# Patient Record
Sex: MALE | Race: OTHER | HISPANIC OR LATINO | URBAN - METROPOLITAN AREA
[De-identification: names, ages, dates, MRNs, and addresses within clinical notes are randomized per-mention and may not be internally consistent; named-entity substitution may affect disease eponyms.]

---

## 2023-10-24 ENCOUNTER — OUTPATIENT (OUTPATIENT)
Dept: OUTPATIENT SERVICES | Facility: HOSPITAL | Age: 68
LOS: 1 days | End: 2023-10-24
Payer: MEDICAID

## 2023-10-24 DIAGNOSIS — J20.9 ACUTE BRONCHITIS, UNSPECIFIED: ICD-10-CM

## 2023-10-24 PROCEDURE — 71046 X-RAY EXAM CHEST 2 VIEWS: CPT

## 2023-10-24 PROCEDURE — 71046 X-RAY EXAM CHEST 2 VIEWS: CPT | Mod: 26

## 2023-10-25 DIAGNOSIS — J20.9 ACUTE BRONCHITIS, UNSPECIFIED: ICD-10-CM

## 2023-12-27 ENCOUNTER — INPATIENT (INPATIENT)
Facility: HOSPITAL | Age: 68
LOS: 1 days | Discharge: HOME | DRG: 193 | End: 2023-12-29
Attending: INTERNAL MEDICINE | Admitting: HOSPITALIST
Payer: MEDICARE

## 2023-12-27 VITALS
HEART RATE: 61 BPM | SYSTOLIC BLOOD PRESSURE: 128 MMHG | DIASTOLIC BLOOD PRESSURE: 58 MMHG | RESPIRATION RATE: 19 BRPM | OXYGEN SATURATION: 100 %

## 2023-12-27 DIAGNOSIS — Z90.49 ACQUIRED ABSENCE OF OTHER SPECIFIED PARTS OF DIGESTIVE TRACT: Chronic | ICD-10-CM

## 2023-12-27 DIAGNOSIS — R06.2 WHEEZING: ICD-10-CM

## 2023-12-27 DIAGNOSIS — B97.89 OTHER VIRAL AGENTS AS THE CAUSE OF DISEASES CLASSIFIED ELSEWHERE: ICD-10-CM

## 2023-12-27 DIAGNOSIS — Z98.890 OTHER SPECIFIED POSTPROCEDURAL STATES: Chronic | ICD-10-CM

## 2023-12-27 DIAGNOSIS — R53.1 WEAKNESS: ICD-10-CM

## 2023-12-27 LAB
ALBUMIN SERPL ELPH-MCNC: 3.7 G/DL — SIGNIFICANT CHANGE UP (ref 3.5–5.2)
ALBUMIN SERPL ELPH-MCNC: 3.7 G/DL — SIGNIFICANT CHANGE UP (ref 3.5–5.2)
ALP SERPL-CCNC: 58 U/L — SIGNIFICANT CHANGE UP (ref 30–115)
ALP SERPL-CCNC: 58 U/L — SIGNIFICANT CHANGE UP (ref 30–115)
ALT FLD-CCNC: 30 U/L — SIGNIFICANT CHANGE UP (ref 0–41)
ALT FLD-CCNC: 30 U/L — SIGNIFICANT CHANGE UP (ref 0–41)
ANION GAP SERPL CALC-SCNC: 12 MMOL/L — SIGNIFICANT CHANGE UP (ref 7–14)
ANION GAP SERPL CALC-SCNC: 12 MMOL/L — SIGNIFICANT CHANGE UP (ref 7–14)
APTT BLD: 37.3 SEC — SIGNIFICANT CHANGE UP (ref 27–39.2)
APTT BLD: 37.3 SEC — SIGNIFICANT CHANGE UP (ref 27–39.2)
AST SERPL-CCNC: 64 U/L — HIGH (ref 0–41)
AST SERPL-CCNC: 64 U/L — HIGH (ref 0–41)
BASE EXCESS BLDV CALC-SCNC: 3.6 MMOL/L — HIGH (ref -2–3)
BASE EXCESS BLDV CALC-SCNC: 3.6 MMOL/L — HIGH (ref -2–3)
BASOPHILS # BLD AUTO: 0.02 K/UL — SIGNIFICANT CHANGE UP (ref 0–0.2)
BASOPHILS # BLD AUTO: 0.02 K/UL — SIGNIFICANT CHANGE UP (ref 0–0.2)
BASOPHILS NFR BLD AUTO: 0.3 % — SIGNIFICANT CHANGE UP (ref 0–1)
BASOPHILS NFR BLD AUTO: 0.3 % — SIGNIFICANT CHANGE UP (ref 0–1)
BILIRUB SERPL-MCNC: 0.4 MG/DL — SIGNIFICANT CHANGE UP (ref 0.2–1.2)
BILIRUB SERPL-MCNC: 0.4 MG/DL — SIGNIFICANT CHANGE UP (ref 0.2–1.2)
BUN SERPL-MCNC: 18 MG/DL — SIGNIFICANT CHANGE UP (ref 10–20)
BUN SERPL-MCNC: 18 MG/DL — SIGNIFICANT CHANGE UP (ref 10–20)
CA-I SERPL-SCNC: 1.15 MMOL/L — SIGNIFICANT CHANGE UP (ref 1.15–1.33)
CA-I SERPL-SCNC: 1.15 MMOL/L — SIGNIFICANT CHANGE UP (ref 1.15–1.33)
CALCIUM SERPL-MCNC: 8.8 MG/DL — SIGNIFICANT CHANGE UP (ref 8.4–10.4)
CALCIUM SERPL-MCNC: 8.8 MG/DL — SIGNIFICANT CHANGE UP (ref 8.4–10.4)
CHLORIDE SERPL-SCNC: 100 MMOL/L — SIGNIFICANT CHANGE UP (ref 98–110)
CHLORIDE SERPL-SCNC: 100 MMOL/L — SIGNIFICANT CHANGE UP (ref 98–110)
CO2 SERPL-SCNC: 26 MMOL/L — SIGNIFICANT CHANGE UP (ref 17–32)
CO2 SERPL-SCNC: 26 MMOL/L — SIGNIFICANT CHANGE UP (ref 17–32)
CREAT SERPL-MCNC: 0.8 MG/DL — SIGNIFICANT CHANGE UP (ref 0.7–1.5)
CREAT SERPL-MCNC: 0.8 MG/DL — SIGNIFICANT CHANGE UP (ref 0.7–1.5)
EGFR: 96 ML/MIN/1.73M2 — SIGNIFICANT CHANGE UP
EGFR: 96 ML/MIN/1.73M2 — SIGNIFICANT CHANGE UP
EOSINOPHIL # BLD AUTO: 0 K/UL — SIGNIFICANT CHANGE UP (ref 0–0.7)
EOSINOPHIL # BLD AUTO: 0 K/UL — SIGNIFICANT CHANGE UP (ref 0–0.7)
EOSINOPHIL NFR BLD AUTO: 0 % — SIGNIFICANT CHANGE UP (ref 0–8)
EOSINOPHIL NFR BLD AUTO: 0 % — SIGNIFICANT CHANGE UP (ref 0–8)
FLUAV AG NPH QL: DETECTED
FLUAV AG NPH QL: DETECTED
FLUBV AG NPH QL: SIGNIFICANT CHANGE UP
FLUBV AG NPH QL: SIGNIFICANT CHANGE UP
GAS PNL BLDV: 134 MMOL/L — LOW (ref 136–145)
GAS PNL BLDV: 134 MMOL/L — LOW (ref 136–145)
GAS PNL BLDV: SIGNIFICANT CHANGE UP
GLUCOSE SERPL-MCNC: 109 MG/DL — HIGH (ref 70–99)
GLUCOSE SERPL-MCNC: 109 MG/DL — HIGH (ref 70–99)
HCO3 BLDV-SCNC: 29 MMOL/L — SIGNIFICANT CHANGE UP (ref 22–29)
HCO3 BLDV-SCNC: 29 MMOL/L — SIGNIFICANT CHANGE UP (ref 22–29)
HCT VFR BLD CALC: 38.1 % — LOW (ref 42–52)
HCT VFR BLD CALC: 38.1 % — LOW (ref 42–52)
HCT VFR BLDA CALC: 36 % — LOW (ref 39–51)
HCT VFR BLDA CALC: 36 % — LOW (ref 39–51)
HGB BLD CALC-MCNC: 11.9 G/DL — LOW (ref 12.6–17.4)
HGB BLD CALC-MCNC: 11.9 G/DL — LOW (ref 12.6–17.4)
HGB BLD-MCNC: 12.6 G/DL — LOW (ref 14–18)
HGB BLD-MCNC: 12.6 G/DL — LOW (ref 14–18)
IMM GRANULOCYTES NFR BLD AUTO: 0.3 % — SIGNIFICANT CHANGE UP (ref 0.1–0.3)
IMM GRANULOCYTES NFR BLD AUTO: 0.3 % — SIGNIFICANT CHANGE UP (ref 0.1–0.3)
INR BLD: 1.16 RATIO — SIGNIFICANT CHANGE UP (ref 0.65–1.3)
INR BLD: 1.16 RATIO — SIGNIFICANT CHANGE UP (ref 0.65–1.3)
LACTATE BLDV-MCNC: 2.2 MMOL/L — HIGH (ref 0.5–2)
LACTATE BLDV-MCNC: 2.2 MMOL/L — HIGH (ref 0.5–2)
LACTATE SERPL-SCNC: 2 MMOL/L — SIGNIFICANT CHANGE UP (ref 0.7–2)
LACTATE SERPL-SCNC: 2 MMOL/L — SIGNIFICANT CHANGE UP (ref 0.7–2)
LIDOCAIN IGE QN: 18 U/L — SIGNIFICANT CHANGE UP (ref 7–60)
LIDOCAIN IGE QN: 18 U/L — SIGNIFICANT CHANGE UP (ref 7–60)
LYMPHOCYTES # BLD AUTO: 0.8 K/UL — LOW (ref 1.2–3.4)
LYMPHOCYTES # BLD AUTO: 0.8 K/UL — LOW (ref 1.2–3.4)
LYMPHOCYTES # BLD AUTO: 13.3 % — LOW (ref 20.5–51.1)
LYMPHOCYTES # BLD AUTO: 13.3 % — LOW (ref 20.5–51.1)
MAGNESIUM SERPL-MCNC: 2 MG/DL — SIGNIFICANT CHANGE UP (ref 1.8–2.4)
MAGNESIUM SERPL-MCNC: 2 MG/DL — SIGNIFICANT CHANGE UP (ref 1.8–2.4)
MCHC RBC-ENTMCNC: 30.8 PG — SIGNIFICANT CHANGE UP (ref 27–31)
MCHC RBC-ENTMCNC: 30.8 PG — SIGNIFICANT CHANGE UP (ref 27–31)
MCHC RBC-ENTMCNC: 33.1 G/DL — SIGNIFICANT CHANGE UP (ref 32–37)
MCHC RBC-ENTMCNC: 33.1 G/DL — SIGNIFICANT CHANGE UP (ref 32–37)
MCV RBC AUTO: 93.2 FL — SIGNIFICANT CHANGE UP (ref 80–94)
MCV RBC AUTO: 93.2 FL — SIGNIFICANT CHANGE UP (ref 80–94)
MONOCYTES # BLD AUTO: 1 K/UL — HIGH (ref 0.1–0.6)
MONOCYTES # BLD AUTO: 1 K/UL — HIGH (ref 0.1–0.6)
MONOCYTES NFR BLD AUTO: 16.6 % — HIGH (ref 1.7–9.3)
MONOCYTES NFR BLD AUTO: 16.6 % — HIGH (ref 1.7–9.3)
NEUTROPHILS # BLD AUTO: 4.19 K/UL — SIGNIFICANT CHANGE UP (ref 1.4–6.5)
NEUTROPHILS # BLD AUTO: 4.19 K/UL — SIGNIFICANT CHANGE UP (ref 1.4–6.5)
NEUTROPHILS NFR BLD AUTO: 69.5 % — SIGNIFICANT CHANGE UP (ref 42.2–75.2)
NEUTROPHILS NFR BLD AUTO: 69.5 % — SIGNIFICANT CHANGE UP (ref 42.2–75.2)
NRBC # BLD: 0 /100 WBCS — SIGNIFICANT CHANGE UP (ref 0–0)
NRBC # BLD: 0 /100 WBCS — SIGNIFICANT CHANGE UP (ref 0–0)
NT-PROBNP SERPL-SCNC: 3157 PG/ML — HIGH (ref 0–300)
NT-PROBNP SERPL-SCNC: 3157 PG/ML — HIGH (ref 0–300)
PCO2 BLDV: 47 MMHG — SIGNIFICANT CHANGE UP (ref 42–55)
PCO2 BLDV: 47 MMHG — SIGNIFICANT CHANGE UP (ref 42–55)
PH BLDV: 7.4 — SIGNIFICANT CHANGE UP (ref 7.32–7.43)
PH BLDV: 7.4 — SIGNIFICANT CHANGE UP (ref 7.32–7.43)
PLATELET # BLD AUTO: 154 K/UL — SIGNIFICANT CHANGE UP (ref 130–400)
PLATELET # BLD AUTO: 154 K/UL — SIGNIFICANT CHANGE UP (ref 130–400)
PMV BLD: 12.6 FL — HIGH (ref 7.4–10.4)
PMV BLD: 12.6 FL — HIGH (ref 7.4–10.4)
PO2 BLDV: 35 MMHG — SIGNIFICANT CHANGE UP (ref 25–45)
PO2 BLDV: 35 MMHG — SIGNIFICANT CHANGE UP (ref 25–45)
POTASSIUM BLDV-SCNC: 5.2 MMOL/L — HIGH (ref 3.5–5.1)
POTASSIUM BLDV-SCNC: 5.2 MMOL/L — HIGH (ref 3.5–5.1)
POTASSIUM SERPL-MCNC: 5.7 MMOL/L — HIGH (ref 3.5–5)
POTASSIUM SERPL-MCNC: 5.7 MMOL/L — HIGH (ref 3.5–5)
POTASSIUM SERPL-SCNC: 5.7 MMOL/L — HIGH (ref 3.5–5)
POTASSIUM SERPL-SCNC: 5.7 MMOL/L — HIGH (ref 3.5–5)
PROT SERPL-MCNC: 6.9 G/DL — SIGNIFICANT CHANGE UP (ref 6–8)
PROT SERPL-MCNC: 6.9 G/DL — SIGNIFICANT CHANGE UP (ref 6–8)
PROTHROM AB SERPL-ACNC: 13.2 SEC — HIGH (ref 9.95–12.87)
PROTHROM AB SERPL-ACNC: 13.2 SEC — HIGH (ref 9.95–12.87)
RBC # BLD: 4.09 M/UL — LOW (ref 4.7–6.1)
RBC # BLD: 4.09 M/UL — LOW (ref 4.7–6.1)
RBC # FLD: 14.3 % — SIGNIFICANT CHANGE UP (ref 11.5–14.5)
RBC # FLD: 14.3 % — SIGNIFICANT CHANGE UP (ref 11.5–14.5)
RSV RNA NPH QL NAA+NON-PROBE: DETECTED
RSV RNA NPH QL NAA+NON-PROBE: DETECTED
SAO2 % BLDV: 47.7 % — LOW (ref 67–88)
SAO2 % BLDV: 47.7 % — LOW (ref 67–88)
SARS-COV-2 RNA SPEC QL NAA+PROBE: SIGNIFICANT CHANGE UP
SARS-COV-2 RNA SPEC QL NAA+PROBE: SIGNIFICANT CHANGE UP
SODIUM SERPL-SCNC: 138 MMOL/L — SIGNIFICANT CHANGE UP (ref 135–146)
SODIUM SERPL-SCNC: 138 MMOL/L — SIGNIFICANT CHANGE UP (ref 135–146)
WBC # BLD: 6.03 K/UL — SIGNIFICANT CHANGE UP (ref 4.8–10.8)
WBC # BLD: 6.03 K/UL — SIGNIFICANT CHANGE UP (ref 4.8–10.8)
WBC # FLD AUTO: 6.03 K/UL — SIGNIFICANT CHANGE UP (ref 4.8–10.8)
WBC # FLD AUTO: 6.03 K/UL — SIGNIFICANT CHANGE UP (ref 4.8–10.8)

## 2023-12-27 PROCEDURE — 72170 X-RAY EXAM OF PELVIS: CPT | Mod: 26

## 2023-12-27 PROCEDURE — 71045 X-RAY EXAM CHEST 1 VIEW: CPT

## 2023-12-27 PROCEDURE — 93005 ELECTROCARDIOGRAM TRACING: CPT

## 2023-12-27 PROCEDURE — 86803 HEPATITIS C AB TEST: CPT

## 2023-12-27 PROCEDURE — 82962 GLUCOSE BLOOD TEST: CPT

## 2023-12-27 PROCEDURE — 99222 1ST HOSP IP/OBS MODERATE 55: CPT | Mod: GC

## 2023-12-27 PROCEDURE — 85025 COMPLETE CBC W/AUTO DIFF WBC: CPT

## 2023-12-27 PROCEDURE — 83036 HEMOGLOBIN GLYCOSYLATED A1C: CPT

## 2023-12-27 PROCEDURE — 80053 COMPREHEN METABOLIC PANEL: CPT

## 2023-12-27 PROCEDURE — 71045 X-RAY EXAM CHEST 1 VIEW: CPT | Mod: 26

## 2023-12-27 PROCEDURE — 72125 CT NECK SPINE W/O DYE: CPT | Mod: 26,MA

## 2023-12-27 PROCEDURE — 93306 TTE W/DOPPLER COMPLETE: CPT

## 2023-12-27 PROCEDURE — 97162 PT EVAL MOD COMPLEX 30 MIN: CPT | Mod: GP

## 2023-12-27 PROCEDURE — G0378: CPT

## 2023-12-27 PROCEDURE — 73030 X-RAY EXAM OF SHOULDER: CPT | Mod: 26,LT,RT

## 2023-12-27 PROCEDURE — 84484 ASSAY OF TROPONIN QUANT: CPT

## 2023-12-27 PROCEDURE — 94640 AIRWAY INHALATION TREATMENT: CPT

## 2023-12-27 PROCEDURE — 83735 ASSAY OF MAGNESIUM: CPT

## 2023-12-27 PROCEDURE — 70450 CT HEAD/BRAIN W/O DYE: CPT | Mod: 26,MA

## 2023-12-27 PROCEDURE — 99285 EMERGENCY DEPT VISIT HI MDM: CPT

## 2023-12-27 PROCEDURE — 36415 COLL VENOUS BLD VENIPUNCTURE: CPT

## 2023-12-27 RX ORDER — LISINOPRIL 2.5 MG/1
10 TABLET ORAL DAILY
Refills: 0 | Status: DISCONTINUED | OUTPATIENT
Start: 2023-12-27 | End: 2023-12-27

## 2023-12-27 RX ORDER — ASPIRIN/CALCIUM CARB/MAGNESIUM 324 MG
1 TABLET ORAL
Refills: 0 | DISCHARGE

## 2023-12-27 RX ORDER — LISINOPRIL 2.5 MG/1
1 TABLET ORAL
Refills: 0 | DISCHARGE

## 2023-12-27 RX ORDER — ENOXAPARIN SODIUM 100 MG/ML
40 INJECTION SUBCUTANEOUS EVERY 24 HOURS
Refills: 0 | Status: DISCONTINUED | OUTPATIENT
Start: 2023-12-27 | End: 2023-12-29

## 2023-12-27 RX ORDER — IPRATROPIUM/ALBUTEROL SULFATE 18-103MCG
3 AEROSOL WITH ADAPTER (GRAM) INHALATION ONCE
Refills: 0 | Status: COMPLETED | OUTPATIENT
Start: 2023-12-27 | End: 2023-12-27

## 2023-12-27 RX ORDER — ATORVASTATIN CALCIUM 80 MG/1
40 TABLET, FILM COATED ORAL AT BEDTIME
Refills: 0 | Status: DISCONTINUED | OUTPATIENT
Start: 2023-12-27 | End: 2023-12-29

## 2023-12-27 RX ORDER — FUROSEMIDE 40 MG
20 TABLET ORAL DAILY
Refills: 0 | Status: DISCONTINUED | OUTPATIENT
Start: 2023-12-27 | End: 2023-12-28

## 2023-12-27 RX ORDER — MECLIZINE HCL 12.5 MG
1 TABLET ORAL
Refills: 0 | DISCHARGE

## 2023-12-27 RX ORDER — CARVEDILOL PHOSPHATE 80 MG/1
12.5 CAPSULE, EXTENDED RELEASE ORAL EVERY 12 HOURS
Refills: 0 | Status: DISCONTINUED | OUTPATIENT
Start: 2023-12-27 | End: 2023-12-29

## 2023-12-27 RX ORDER — METFORMIN HYDROCHLORIDE 850 MG/1
1 TABLET ORAL
Refills: 0 | DISCHARGE

## 2023-12-27 RX ORDER — ASPIRIN/CALCIUM CARB/MAGNESIUM 324 MG
81 TABLET ORAL DAILY
Refills: 0 | Status: DISCONTINUED | OUTPATIENT
Start: 2023-12-27 | End: 2023-12-29

## 2023-12-27 RX ORDER — ATORVASTATIN CALCIUM 80 MG/1
1 TABLET, FILM COATED ORAL
Refills: 0 | DISCHARGE

## 2023-12-27 RX ORDER — ONDANSETRON 8 MG/1
4 TABLET, FILM COATED ORAL ONCE
Refills: 0 | Status: COMPLETED | OUTPATIENT
Start: 2023-12-27 | End: 2023-12-27

## 2023-12-27 RX ORDER — IPRATROPIUM/ALBUTEROL SULFATE 18-103MCG
3 AEROSOL WITH ADAPTER (GRAM) INHALATION EVERY 6 HOURS
Refills: 0 | Status: DISCONTINUED | OUTPATIENT
Start: 2023-12-27 | End: 2023-12-29

## 2023-12-27 RX ORDER — MECLIZINE HCL 12.5 MG
12.5 TABLET ORAL DAILY
Refills: 0 | Status: DISCONTINUED | OUTPATIENT
Start: 2023-12-27 | End: 2023-12-29

## 2023-12-27 RX ORDER — CARVEDILOL PHOSPHATE 80 MG/1
1 CAPSULE, EXTENDED RELEASE ORAL
Refills: 0 | DISCHARGE

## 2023-12-27 RX ADMIN — Medication 3 MILLILITER(S): at 15:40

## 2023-12-27 RX ADMIN — Medication 3 MILLILITER(S): at 15:39

## 2023-12-27 RX ADMIN — Medication 125 MILLIGRAM(S): at 15:40

## 2023-12-27 RX ADMIN — Medication 75 MILLIGRAM(S): at 18:16

## 2023-12-27 NOTE — H&P ADULT - ATTENDING COMMENTS
HPI:   67 yo man Serbian speaking with pmh of CAD, and HTN, presents for fall from bed with trauma to shoulders.  No head trauma, no AC, no syncope   patient admitted to shortness of breath and coughing  since 2 days. He reports that his wife had the flu recently.  No chest  pain, no palpitations, no leg swelling, no syncope    Patient is a very poor historian    In ED he was found to have influenza pneumonia and does not know his medication or his medical conditions.  PMH obtained from ED chart. (27 Dec 2023 21:49)    REVIEW OF SYSTEMS: see cc/HPI   CONSTITUTIONAL: No weakness, fevers or chills  EYES/ENT: No visual changes;  No vertigo or throat pain   NECK: No pain or stiffness  RESPIRATORY: No cough, wheezing, hemoptysis; No shortness of breath  CARDIOVASCULAR: No chest pain or palpitations  GASTROINTESTINAL: No abdominal or epigastric pain. No nausea, vomiting, or hematemesis; No diarrhea or constipation. No melena or hematochezia.  GENITOURINARY: No dysuria, frequency or hematuria  NEUROLOGICAL: No numbness or weakness  SKIN: No itching, rashes    Physical Exam: Interviewed in Ecuadorean   General: WN/WD NAD  Neurology: A&Ox3, nonfocal, follows commands  Eyes: PERRLA/ EOMI  ENT/Neck: Neck supple, trachea midline, No JVD  Respiratory: B/L decreased breathsound, No wheezing, rales, rhonchi  CV: Normal rate regular rhythm, S1S2, no murmurs, rubs or gallops  Abdominal: Soft, NT, ND +BS, obese   Extremities: No edema, + peripheral pulses  Skin: No Rashes, Hematoma, Ecchymosis    A/p  Dyspnea 2/2 viral infections   Influenza A and RSV   -IV steroids / bronchodilators   -Tamiflu   -pulse ox monitoring and O2 PRN     Fall mechanical   -PT eval     Hyperkalemia - hemolyzed   - repeat K     CAD   HTN   Dyslipidemia   -c/w OP Rx and restart ACE if K - WNL     PATIENT SEEN by ATTENDING 12/27/23 HPI:   67 yo man Albanian speaking with pmh of CAD, and HTN, presents for fall from bed with trauma to shoulders.  No head trauma, no AC, no syncope   patient admitted to shortness of breath and coughing  since 2 days. He reports that his wife had the flu recently.  No chest  pain, no palpitations, no leg swelling, no syncope    Patient is a very poor historian    In ED he was found to have influenza pneumonia and does not know his medication or his medical conditions.  PMH obtained from ED chart. (27 Dec 2023 21:49)    REVIEW OF SYSTEMS: see cc/HPI   CONSTITUTIONAL: No weakness, fevers or chills  EYES/ENT: No visual changes;  No vertigo or throat pain   NECK: No pain or stiffness  RESPIRATORY: No cough, wheezing, hemoptysis; No shortness of breath  CARDIOVASCULAR: No chest pain or palpitations  GASTROINTESTINAL: No abdominal or epigastric pain. No nausea, vomiting, or hematemesis; No diarrhea or constipation. No melena or hematochezia.  GENITOURINARY: No dysuria, frequency or hematuria  NEUROLOGICAL: No numbness or weakness  SKIN: No itching, rashes    Physical Exam: Interviewed in Scottish   General: WN/WD NAD  Neurology: A&Ox3, nonfocal, follows commands  Eyes: PERRLA/ EOMI  ENT/Neck: Neck supple, trachea midline, No JVD  Respiratory: B/L decreased breathsound, No wheezing, rales, rhonchi  CV: Normal rate regular rhythm, S1S2, no murmurs, rubs or gallops  Abdominal: Soft, NT, ND +BS, obese   Extremities: No edema, + peripheral pulses  Skin: No Rashes, Hematoma, Ecchymosis    A/p  Dyspnea 2/2 viral infections   Influenza A and RSV   -IV steroids / bronchodilators   -Tamiflu   -pulse ox monitoring and O2 PRN     Fall mechanical   -PT eval     Hyperkalemia - hemolyzed   - repeat K     CAD   HTN   Dyslipidemia   -c/w OP Rx and restart ACE if K - WNL     PATIENT SEEN by ATTENDING 12/27/23

## 2023-12-27 NOTE — ED ADULT NURSE NOTE - OBJECTIVE STATEMENT
pt came in c/o fall that happened last night on his side. pt states he landed on his R am and + HT. no loc. no ac use.

## 2023-12-27 NOTE — H&P ADULT - NSHPLABSRESULTS_GEN_ALL_CORE
LABS:  cret                        12.6   6.03  )-----------( 154      ( 27 Dec 2023 12:29 )             38.1     12-27    138  |  100  |  18  ----------------------------<  109<H>  5.7<H>   |  26  |  0.8    Ca    8.8      27 Dec 2023 12:29  Mg     2.0     12-27    TPro  6.9  /  Alb  3.7  /  TBili  0.4  /  DBili  x   /  AST  64<H>  /  ALT  30  /  AlkPhos  58  12-27    PT/INR - ( 27 Dec 2023 12:29 )   PT: 13.20 sec;   INR: 1.16 ratio         PTT - ( 27 Dec 2023 12:29 )  PTT:37.3 sec LABS:               12.6   6.03  )-----------( 154      ( 27 Dec 2023 12:29 )             38.1     12-27    138  |  100  |  18  ----------------------------<  109<H>  5.7<H>   |  26  |  0.8    Ca    8.8      27 Dec 2023 12:29  Mg     2.0     12-27    TPro  6.9  /  Alb  3.7  /  TBili  0.4  /  DBili  x   /  AST  64<H>  /  ALT  30  /  AlkPhos  58  12-27    PT/INR - ( 27 Dec 2023 12:29 )   PT: 13.20 sec;   INR: 1.16 ratio         PTT - ( 27 Dec 2023 12:29 )  PTT:37.3 sec

## 2023-12-27 NOTE — ED PROVIDER NOTE - CARE PLAN
Principal Discharge DX:	Influenza A  Secondary Diagnosis:	Respiratory syncytial virus  Secondary Diagnosis:	Weakness   1

## 2023-12-27 NOTE — ED PROVIDER NOTE - PHYSICAL EXAMINATION
VITAL SIGNS: I have reviewed nursing notes and confirm.  CONSTITUTIONAL: Well-developed; well-nourished; in no acute distress.  SKIN: Skin exam is warm and dry, no acute rash.  HEAD: Normocephalic; atraumatic.  EYES: PERRL, EOM intact; conjunctiva and sclera clear.  ENT: No nasal discharge; airway clear. TMs clear.  NECK: Supple; non tender.  CARD: S1, S2 normal; no murmurs, gallops, or rubs. Regular rate and rhythm.  RESP: b/l ronchi and wheezing  ABD: Normal bowel sounds; soft; non-distended; non-tender;  EXT: Normal ROM. No clubbing, cyanosis or edema.  PSYCH: Cooperative, appropriate.

## 2023-12-27 NOTE — ED ADULT NURSE NOTE - NSFALLRISKINTERV_ED_ALL_ED
Assistance OOB with selected safe patient handling equipment if applicable/Assistance with ambulation/Communicate fall risk and risk factors to all staff, patient, and family/Provide visual cue: yellow wristband, yellow gown, etc/Reinforce activity limits and safety measures with patient and family/Call bell, personal items and telephone in reach/Instruct patient to call for assistance before getting out of bed/chair/stretcher/Non-slip footwear applied when patient is off stretcher/[place] to call system/Physically safe environment - no spills, clutter or unnecessary equipment/Purposeful Proactive Rounding/Room/bathroom lighting operational, light cord in reach Assistance OOB with selected safe patient handling equipment if applicable/Assistance with ambulation/Communicate fall risk and risk factors to all staff, patient, and family/Provide visual cue: yellow wristband, yellow gown, etc/Reinforce activity limits and safety measures with patient and family/Call bell, personal items and telephone in reach/Instruct patient to call for assistance before getting out of bed/chair/stretcher/Non-slip footwear applied when patient is off stretcher/[place] to call system/Physically safe environment - no spills, clutter or unnecessary equipment/Purposeful Proactive Rounding/Room/bathroom lighting operational, light cord in reach

## 2023-12-27 NOTE — H&P ADULT - HISTORY OF PRESENT ILLNESS
67 yo man Chinese speaking with pmh of CAD, and HTN, presents for fall from bed with trauma to shoulders.  No head trauma, no AC, no syncope   patient admitted to shortness of breath and coughing  since 2 days. He reports that his wife had the flu recently.  No chest  pain, no palpitations, no leg swelling, no syncompe    Patient is a very poor historian    In ED he was found to have influenza pneumonia and does not know his medication or his medical conditions.  PMH obtained from ED chart.  67 yo man Prydeinig speaking with pmh of CAD, and HTN, presents for fall from bed with trauma to shoulders.  No head trauma, no AC, no syncope   patient admitted to shortness of breath and coughing  since 2 days. He reports that his wife had the flu recently.  No chest  pain, no palpitations, no leg swelling, no syncompe    Patient is a very poor historian    In ED he was found to have influenza pneumonia and does not know his medication or his medical conditions.  PMH obtained from ED chart.

## 2023-12-27 NOTE — ED PROVIDER NOTE - CLINICAL SUMMARY MEDICAL DECISION MAKING FREE TEXT BOX
Pt with fall from bed, wheezing and fluctuating O2 sat in ED, no traumatic pathology on w/u, found to have flu and rsv.  pt says feeling better with neb, but pt still with wheezing and too weak to get up.  will admit to med.  Any ordered labs and EKG were reviewed.  Any imaging was ordered and reviewed by me.  Appropriate medications for patient's presenting complaints were ordered and effects were reassessed.  Patient's records (prior hospital, ED visit, and/or nursing home notes if available) were reviewed.  Additional history was obtained from EMS, family, and/or PCP (where available).  Escalation to admission/observation was considered.  Patient requires inpatient hospitalization - monitored setting.

## 2023-12-27 NOTE — H&P ADULT - ASSESSMENT
#Fall  -PT/OT      #Influenza A   #RSV  - oseltamivir      #hyperkalemia       #Fall  - Mechanical fall  - CT trauma work up  -ve fo rfractures  - PT/OT      #Influenza A   #RSV  - improving  - s/p solu-medrol once  - oseltamivir  - Bronchodilators      #hyperkalemia  - hold lisinopril  - repeat K      #CAD  #HT  #DL  - c/w home meds    #preDM  - A1c   - monitor FS     #Fall  - Mechanical fall  - CT trauma work up  -ve fo rfractures  - PT/OT    #Dyspnea  #Influenza A infection  #RSV infection  #mild volume overload  - PRo-BNP 3100  - improving  - s/p solu-medrol once  - c/w oseltamivir  - Bronchodilators  - lasix 20mg IV qd      #hyperkalemia  - hold lisinopril  - repeat K      #CAD  #HTN  #DL  - c/w home meds except lisinopril    #preDM  - A1c   - monitor FS

## 2023-12-27 NOTE — ED ADULT TRIAGE NOTE - CHIEF COMPLAINT QUOTE
s/p fall last night out of bed at 9 pm. pt complains of right shoulder pain. pt is also having b/l wheezing. initial pox 92 ra

## 2023-12-27 NOTE — ED PROVIDER NOTE - IV ALTEPLASE EXCL REL HIDDEN
show
I, Aparna Kc, participated in the care of this patient with the PA. I discussed the history and physical exam findings as well as lab results and plan of care with the PA. I agree with PA's history, physical and assessment. I agree with final disposition.

## 2023-12-27 NOTE — H&P ADULT - NSHPPHYSICALEXAM_GEN_ALL_CORE
T(C): 37 (12-27-23 @ 15:18), Max: 37.7 (12-27-23 @ 11:37)  HR: 59 (12-27-23 @ 15:18) (59 - 63)  BP: 151/70 (12-27-23 @ 15:18) (128/58 - 151/70)  RR: 18 (12-27-23 @ 15:18) (18 - 19)  SpO2: 98% (12-27-23 @ 15:18) (98% - 100%)    CONSTITUTIONAL: No apparent distress  EYES: PERRLA and symmetric, EOMI, No conjunctival or scleral injection, non-icteric  ENMT: Oral mucosa with moist membranes. no pharyngeal injection or exudates  NECK: Supple, symmetric and without tracheal deviation   RESP: No respiratory distress, no use of accessory muscles; CTA b/l, no WRR  CV: RRR, +S1S2,; no JVD; no peripheral edema  GI: Soft, NT, ND, no rebound, no guarding;   NEURO:AOx3, no focal deficits T(C): 37 (12-27-23 @ 15:18), Max: 37.7 (12-27-23 @ 11:37)  HR: 59 (12-27-23 @ 15:18) (59 - 63)  BP: 151/70 (12-27-23 @ 15:18) (128/58 - 151/70)  RR: 18 (12-27-23 @ 15:18) (18 - 19)  SpO2: 98% (12-27-23 @ 15:18) (98% - 100%)    CONSTITUTIONAL: No apparent distress  EYES: PERRLA and symmetric, EOMI, No conjunctival or scleral injection, non-icteric  ENMT: Oral mucosa with moist membranes. no pharyngeal injection or exudates  NECK: Supple, symmetric and without tracheal deviation   RESP: No respiratory distress,  decreased breath sound bilaterally, no wheezing heard  CV: RRR, +S1S2,; no JVD; no peripheral edema  GI: Soft, NT, ND, no rebound, no guarding;   NEURO:AOx3, no focal deficits

## 2023-12-27 NOTE — H&P ADULT - NSICDXPASTMEDICALHX_GEN_ALL_CORE_FT
PAST MEDICAL HISTORY:  CAD (coronary artery disease)     DM (diabetes mellitus)     Dyslipidemia     Mild HTN

## 2023-12-27 NOTE — ED PROVIDER NOTE - OBJECTIVE STATEMENT
[age] yo m with pmh of cad, htn, presents with c/o b/l shoulder pain s/p fall from bed.  pt admitted coughing x 4 days with mild sob.  no cp, no abd pain, no headache.  unsure if hit head.  mild neck pain.  on asa, no AC

## 2023-12-28 LAB
A1C WITH ESTIMATED AVERAGE GLUCOSE RESULT: 7.6 % — HIGH (ref 4–5.6)
A1C WITH ESTIMATED AVERAGE GLUCOSE RESULT: 7.6 % — HIGH (ref 4–5.6)
ALBUMIN SERPL ELPH-MCNC: 3.9 G/DL — SIGNIFICANT CHANGE UP (ref 3.5–5.2)
ALBUMIN SERPL ELPH-MCNC: 3.9 G/DL — SIGNIFICANT CHANGE UP (ref 3.5–5.2)
ALP SERPL-CCNC: 66 U/L — SIGNIFICANT CHANGE UP (ref 30–115)
ALP SERPL-CCNC: 66 U/L — SIGNIFICANT CHANGE UP (ref 30–115)
ALT FLD-CCNC: 42 U/L — HIGH (ref 0–41)
ALT FLD-CCNC: 42 U/L — HIGH (ref 0–41)
ANION GAP SERPL CALC-SCNC: 14 MMOL/L — SIGNIFICANT CHANGE UP (ref 7–14)
ANION GAP SERPL CALC-SCNC: 14 MMOL/L — SIGNIFICANT CHANGE UP (ref 7–14)
AST SERPL-CCNC: 62 U/L — HIGH (ref 0–41)
AST SERPL-CCNC: 62 U/L — HIGH (ref 0–41)
BASOPHILS # BLD AUTO: 0 K/UL — SIGNIFICANT CHANGE UP (ref 0–0.2)
BASOPHILS # BLD AUTO: 0 K/UL — SIGNIFICANT CHANGE UP (ref 0–0.2)
BASOPHILS NFR BLD AUTO: 0 % — SIGNIFICANT CHANGE UP (ref 0–1)
BASOPHILS NFR BLD AUTO: 0 % — SIGNIFICANT CHANGE UP (ref 0–1)
BILIRUB SERPL-MCNC: 0.3 MG/DL — SIGNIFICANT CHANGE UP (ref 0.2–1.2)
BILIRUB SERPL-MCNC: 0.3 MG/DL — SIGNIFICANT CHANGE UP (ref 0.2–1.2)
BUN SERPL-MCNC: 21 MG/DL — HIGH (ref 10–20)
BUN SERPL-MCNC: 21 MG/DL — HIGH (ref 10–20)
CALCIUM SERPL-MCNC: 8.5 MG/DL — SIGNIFICANT CHANGE UP (ref 8.4–10.4)
CALCIUM SERPL-MCNC: 8.5 MG/DL — SIGNIFICANT CHANGE UP (ref 8.4–10.4)
CHLORIDE SERPL-SCNC: 100 MMOL/L — SIGNIFICANT CHANGE UP (ref 98–110)
CHLORIDE SERPL-SCNC: 100 MMOL/L — SIGNIFICANT CHANGE UP (ref 98–110)
CO2 SERPL-SCNC: 25 MMOL/L — SIGNIFICANT CHANGE UP (ref 17–32)
CO2 SERPL-SCNC: 25 MMOL/L — SIGNIFICANT CHANGE UP (ref 17–32)
CREAT SERPL-MCNC: 0.7 MG/DL — SIGNIFICANT CHANGE UP (ref 0.7–1.5)
CREAT SERPL-MCNC: 0.7 MG/DL — SIGNIFICANT CHANGE UP (ref 0.7–1.5)
EGFR: 100 ML/MIN/1.73M2 — SIGNIFICANT CHANGE UP
EGFR: 100 ML/MIN/1.73M2 — SIGNIFICANT CHANGE UP
EOSINOPHIL # BLD AUTO: 0 K/UL — SIGNIFICANT CHANGE UP (ref 0–0.7)
EOSINOPHIL # BLD AUTO: 0 K/UL — SIGNIFICANT CHANGE UP (ref 0–0.7)
EOSINOPHIL NFR BLD AUTO: 0 % — SIGNIFICANT CHANGE UP (ref 0–8)
EOSINOPHIL NFR BLD AUTO: 0 % — SIGNIFICANT CHANGE UP (ref 0–8)
ESTIMATED AVERAGE GLUCOSE: 171 MG/DL — HIGH (ref 68–114)
ESTIMATED AVERAGE GLUCOSE: 171 MG/DL — HIGH (ref 68–114)
GLUCOSE BLDC GLUCOMTR-MCNC: 274 MG/DL — HIGH (ref 70–99)
GLUCOSE BLDC GLUCOMTR-MCNC: 274 MG/DL — HIGH (ref 70–99)
GLUCOSE BLDC GLUCOMTR-MCNC: 293 MG/DL — HIGH (ref 70–99)
GLUCOSE BLDC GLUCOMTR-MCNC: 293 MG/DL — HIGH (ref 70–99)
GLUCOSE SERPL-MCNC: 227 MG/DL — HIGH (ref 70–99)
GLUCOSE SERPL-MCNC: 227 MG/DL — HIGH (ref 70–99)
HCT VFR BLD CALC: 41.6 % — LOW (ref 42–52)
HCT VFR BLD CALC: 41.6 % — LOW (ref 42–52)
HCV AB S/CO SERPL IA: 0.04 COI — SIGNIFICANT CHANGE UP
HCV AB S/CO SERPL IA: 0.04 COI — SIGNIFICANT CHANGE UP
HCV AB SERPL-IMP: SIGNIFICANT CHANGE UP
HCV AB SERPL-IMP: SIGNIFICANT CHANGE UP
HGB BLD-MCNC: 13.8 G/DL — LOW (ref 14–18)
HGB BLD-MCNC: 13.8 G/DL — LOW (ref 14–18)
IMM GRANULOCYTES NFR BLD AUTO: 0.3 % — SIGNIFICANT CHANGE UP (ref 0.1–0.3)
IMM GRANULOCYTES NFR BLD AUTO: 0.3 % — SIGNIFICANT CHANGE UP (ref 0.1–0.3)
LYMPHOCYTES # BLD AUTO: 0.79 K/UL — LOW (ref 1.2–3.4)
LYMPHOCYTES # BLD AUTO: 0.79 K/UL — LOW (ref 1.2–3.4)
LYMPHOCYTES # BLD AUTO: 12.3 % — LOW (ref 20.5–51.1)
LYMPHOCYTES # BLD AUTO: 12.3 % — LOW (ref 20.5–51.1)
MAGNESIUM SERPL-MCNC: 2.1 MG/DL — SIGNIFICANT CHANGE UP (ref 1.8–2.4)
MAGNESIUM SERPL-MCNC: 2.1 MG/DL — SIGNIFICANT CHANGE UP (ref 1.8–2.4)
MCHC RBC-ENTMCNC: 30.4 PG — SIGNIFICANT CHANGE UP (ref 27–31)
MCHC RBC-ENTMCNC: 30.4 PG — SIGNIFICANT CHANGE UP (ref 27–31)
MCHC RBC-ENTMCNC: 33.2 G/DL — SIGNIFICANT CHANGE UP (ref 32–37)
MCHC RBC-ENTMCNC: 33.2 G/DL — SIGNIFICANT CHANGE UP (ref 32–37)
MCV RBC AUTO: 91.6 FL — SIGNIFICANT CHANGE UP (ref 80–94)
MCV RBC AUTO: 91.6 FL — SIGNIFICANT CHANGE UP (ref 80–94)
MONOCYTES # BLD AUTO: 0.29 K/UL — SIGNIFICANT CHANGE UP (ref 0.1–0.6)
MONOCYTES # BLD AUTO: 0.29 K/UL — SIGNIFICANT CHANGE UP (ref 0.1–0.6)
MONOCYTES NFR BLD AUTO: 4.5 % — SIGNIFICANT CHANGE UP (ref 1.7–9.3)
MONOCYTES NFR BLD AUTO: 4.5 % — SIGNIFICANT CHANGE UP (ref 1.7–9.3)
NEUTROPHILS # BLD AUTO: 5.32 K/UL — SIGNIFICANT CHANGE UP (ref 1.4–6.5)
NEUTROPHILS # BLD AUTO: 5.32 K/UL — SIGNIFICANT CHANGE UP (ref 1.4–6.5)
NEUTROPHILS NFR BLD AUTO: 82.9 % — HIGH (ref 42.2–75.2)
NEUTROPHILS NFR BLD AUTO: 82.9 % — HIGH (ref 42.2–75.2)
NRBC # BLD: 0 /100 WBCS — SIGNIFICANT CHANGE UP (ref 0–0)
NRBC # BLD: 0 /100 WBCS — SIGNIFICANT CHANGE UP (ref 0–0)
PLATELET # BLD AUTO: 150 K/UL — SIGNIFICANT CHANGE UP (ref 130–400)
PLATELET # BLD AUTO: 150 K/UL — SIGNIFICANT CHANGE UP (ref 130–400)
PMV BLD: 12.4 FL — HIGH (ref 7.4–10.4)
PMV BLD: 12.4 FL — HIGH (ref 7.4–10.4)
POTASSIUM SERPL-MCNC: 4.5 MMOL/L — SIGNIFICANT CHANGE UP (ref 3.5–5)
POTASSIUM SERPL-MCNC: 4.5 MMOL/L — SIGNIFICANT CHANGE UP (ref 3.5–5)
POTASSIUM SERPL-SCNC: 4.5 MMOL/L — SIGNIFICANT CHANGE UP (ref 3.5–5)
POTASSIUM SERPL-SCNC: 4.5 MMOL/L — SIGNIFICANT CHANGE UP (ref 3.5–5)
PROT SERPL-MCNC: 7 G/DL — SIGNIFICANT CHANGE UP (ref 6–8)
PROT SERPL-MCNC: 7 G/DL — SIGNIFICANT CHANGE UP (ref 6–8)
RBC # BLD: 4.54 M/UL — LOW (ref 4.7–6.1)
RBC # BLD: 4.54 M/UL — LOW (ref 4.7–6.1)
RBC # FLD: 13.9 % — SIGNIFICANT CHANGE UP (ref 11.5–14.5)
RBC # FLD: 13.9 % — SIGNIFICANT CHANGE UP (ref 11.5–14.5)
SODIUM SERPL-SCNC: 139 MMOL/L — SIGNIFICANT CHANGE UP (ref 135–146)
SODIUM SERPL-SCNC: 139 MMOL/L — SIGNIFICANT CHANGE UP (ref 135–146)
TROPONIN T, HIGH SENSITIVITY RESULT: 31 NG/L — HIGH (ref 6–21)
TROPONIN T, HIGH SENSITIVITY RESULT: 31 NG/L — HIGH (ref 6–21)
TROPONIN T, HIGH SENSITIVITY RESULT: 36 NG/L — HIGH (ref 6–21)
TROPONIN T, HIGH SENSITIVITY RESULT: 36 NG/L — HIGH (ref 6–21)
WBC # BLD: 6.42 K/UL — SIGNIFICANT CHANGE UP (ref 4.8–10.8)
WBC # BLD: 6.42 K/UL — SIGNIFICANT CHANGE UP (ref 4.8–10.8)
WBC # FLD AUTO: 6.42 K/UL — SIGNIFICANT CHANGE UP (ref 4.8–10.8)
WBC # FLD AUTO: 6.42 K/UL — SIGNIFICANT CHANGE UP (ref 4.8–10.8)

## 2023-12-28 PROCEDURE — 93010 ELECTROCARDIOGRAM REPORT: CPT

## 2023-12-28 PROCEDURE — 99232 SBSQ HOSP IP/OBS MODERATE 35: CPT

## 2023-12-28 RX ORDER — DEXTROSE 50 % IN WATER 50 %
25 SYRINGE (ML) INTRAVENOUS ONCE
Refills: 0 | Status: DISCONTINUED | OUTPATIENT
Start: 2023-12-28 | End: 2023-12-29

## 2023-12-28 RX ORDER — FUROSEMIDE 40 MG
20 TABLET ORAL EVERY 12 HOURS
Refills: 0 | Status: DISCONTINUED | OUTPATIENT
Start: 2023-12-28 | End: 2023-12-28

## 2023-12-28 RX ORDER — GLUCAGON INJECTION, SOLUTION 0.5 MG/.1ML
1 INJECTION, SOLUTION SUBCUTANEOUS ONCE
Refills: 0 | Status: DISCONTINUED | OUTPATIENT
Start: 2023-12-28 | End: 2023-12-29

## 2023-12-28 RX ORDER — DEXTROSE 50 % IN WATER 50 %
12.5 SYRINGE (ML) INTRAVENOUS ONCE
Refills: 0 | Status: DISCONTINUED | OUTPATIENT
Start: 2023-12-28 | End: 2023-12-29

## 2023-12-28 RX ORDER — SODIUM CHLORIDE 9 MG/ML
1000 INJECTION, SOLUTION INTRAVENOUS
Refills: 0 | Status: DISCONTINUED | OUTPATIENT
Start: 2023-12-28 | End: 2023-12-29

## 2023-12-28 RX ORDER — FUROSEMIDE 40 MG
40 TABLET ORAL DAILY
Refills: 0 | Status: DISCONTINUED | OUTPATIENT
Start: 2023-12-29 | End: 2023-12-29

## 2023-12-28 RX ORDER — DEXTROSE 50 % IN WATER 50 %
15 SYRINGE (ML) INTRAVENOUS ONCE
Refills: 0 | Status: DISCONTINUED | OUTPATIENT
Start: 2023-12-28 | End: 2023-12-29

## 2023-12-28 RX ORDER — LISINOPRIL 2.5 MG/1
10 TABLET ORAL DAILY
Refills: 0 | Status: DISCONTINUED | OUTPATIENT
Start: 2023-12-28 | End: 2023-12-29

## 2023-12-28 RX ORDER — INSULIN LISPRO 100/ML
VIAL (ML) SUBCUTANEOUS
Refills: 0 | Status: DISCONTINUED | OUTPATIENT
Start: 2023-12-28 | End: 2023-12-29

## 2023-12-28 RX ORDER — INSULIN LISPRO 100/ML
3 VIAL (ML) SUBCUTANEOUS
Refills: 0 | Status: DISCONTINUED | OUTPATIENT
Start: 2023-12-28 | End: 2023-12-29

## 2023-12-28 RX ORDER — POLYETHYLENE GLYCOL 3350 17 G/17G
17 POWDER, FOR SOLUTION ORAL DAILY
Refills: 0 | Status: DISCONTINUED | OUTPATIENT
Start: 2023-12-28 | End: 2023-12-29

## 2023-12-28 RX ORDER — INSULIN GLARGINE 100 [IU]/ML
12 INJECTION, SOLUTION SUBCUTANEOUS AT BEDTIME
Refills: 0 | Status: DISCONTINUED | OUTPATIENT
Start: 2023-12-28 | End: 2023-12-29

## 2023-12-28 RX ADMIN — Medication 40 MILLIGRAM(S): at 10:04

## 2023-12-28 RX ADMIN — Medication 3: at 17:18

## 2023-12-28 RX ADMIN — Medication 3 MILLILITER(S): at 08:20

## 2023-12-28 RX ADMIN — Medication 3 MILLILITER(S): at 02:23

## 2023-12-28 RX ADMIN — INSULIN GLARGINE 12 UNIT(S): 100 INJECTION, SOLUTION SUBCUTANEOUS at 21:55

## 2023-12-28 RX ADMIN — Medication 75 MILLIGRAM(S): at 08:10

## 2023-12-28 RX ADMIN — Medication 12.5 MILLIGRAM(S): at 13:50

## 2023-12-28 RX ADMIN — Medication 3 MILLILITER(S): at 13:50

## 2023-12-28 RX ADMIN — Medication 75 MILLIGRAM(S): at 17:17

## 2023-12-28 RX ADMIN — Medication 3 MILLILITER(S): at 22:36

## 2023-12-28 RX ADMIN — ATORVASTATIN CALCIUM 40 MILLIGRAM(S): 80 TABLET, FILM COATED ORAL at 22:36

## 2023-12-28 RX ADMIN — CARVEDILOL PHOSPHATE 12.5 MILLIGRAM(S): 80 CAPSULE, EXTENDED RELEASE ORAL at 06:54

## 2023-12-28 RX ADMIN — Medication 81 MILLIGRAM(S): at 13:46

## 2023-12-28 RX ADMIN — Medication 3 UNIT(S): at 17:17

## 2023-12-28 RX ADMIN — Medication 20 MILLIGRAM(S): at 06:54

## 2023-12-28 RX ADMIN — CARVEDILOL PHOSPHATE 12.5 MILLIGRAM(S): 80 CAPSULE, EXTENDED RELEASE ORAL at 17:17

## 2023-12-28 NOTE — DISCHARGE NOTE NURSING/CASE MANAGEMENT/SOCIAL WORK - NSSCNAMETXT_GEN_ALL_CORE
Triage for Controlled Substance Refill Request    Pain Diagnosis: Chronic Pain    Last Outpatient Visit: 6/8/2020    Next Outpatient Visit:7/6/2020    Reason for refill needed outside of office visit?prescribed only on a monthly basis    Pain escalation requiring increased medication- no    Pharmacy: See pain contract    Medication: See Prescription requested       reviewed:today by Dr. Douglas Davila and filed in  folder, separate from chart    Date of Urine Drug Screen:  3/22/2019      Opioid Safety Handout given: Always prints with the Patient AVS    Appropriate for refill: yes    Action:  Rx sent to [facility]

## 2023-12-28 NOTE — DISCHARGE NOTE NURSING/CASE MANAGEMENT/SOCIAL WORK - PATIENT PORTAL LINK FT
You can access the FollowMyHealth Patient Portal offered by [facility] by registering at the following website: [email or web address]. By joining [email or web address] [email or web address] portal, you will also be able to view your health information using other applications (apps) compatible with our system. You can access the FollowMyHealth Patient Portal offered by [facility] by registering at the following website: [email or web address]. By joining [facility]’s FollowMyHealth portal, you will also be able to view your health information using other applications (apps) compatible with our system.

## 2023-12-28 NOTE — DISCHARGE NOTE NURSING/CASE MANAGEMENT/SOCIAL WORK - NSDCPEFALRISK_GEN_ALL_CORE
For information on Fall & Injury Prevention, visit: [email or web address] OR  [email or web address] OR  https://www.cdc.gov/steadi/patient.html For information on Fall & Injury Prevention, visit: [email or web address] OR  [email or web address] OR  https://www.cdc.gov/steadi/patient.html

## 2023-12-28 NOTE — ED ADULT NURSE REASSESSMENT NOTE - NS ED NURSE REASSESS COMMENT FT1
Pt assessed. A/Ox4. VSS. IVL intact. denies pain/discomfort at this time. safety precautions maintained.

## 2023-12-28 NOTE — PROGRESS NOTE ADULT - SUBJECTIVE AND OBJECTIVE BOX
SABI WILKERSON  68y  [facility]      Patient is a 68y old  Male who presents with a chief complaint of FALL (27 Dec 2023 21:49)      INTERVAL HPI/OVERNIGHT EVENTS:        REVIEW OF SYSTEMS:        FAMILY HISTORY:    T(C): 36.1 (12-28-23 @ 07:48), Max: 37.7 (12-27-23 @ 11:37)  HR: 56 (12-28-23 @ 07:48) (55 - 63)  BP: 144/70 (12-28-23 @ 07:48) (128/58 - 169/77)  RR: 18 (12-28-23 @ 07:48) (18 - 19)  SpO2: 96% (12-28-23 @ 07:48) (93% - 100%)  Wt(kg): --Vital Signs Last 24 Hrs  T(C): 36.1 (28 Dec 2023 07:48), Max: 37.7 (27 Dec 2023 11:37)  T(F): 97 (28 Dec 2023 07:48), Max: 99.9 (27 Dec 2023 11:37)  HR: 56 (28 Dec 2023 07:48) (55 - 63)  BP: 144/70 (28 Dec 2023 07:48) (128/58 - 169/77)  BP(mean): --  RR: 18 (28 Dec 2023 07:48) (18 - 19)  SpO2: 96% (28 Dec 2023 07:48) (93% - 100%)    Parameters below as of 28 Dec 2023 06:56  Patient On (Oxygen Delivery Method): room air        PHYSICAL EXAM:  GENERAL: NAD, well-groomed, well-developed  HEAD:  Atraumatic, Normocephalic  EYES: EOMI, PERRLA, conjunctiva and sclera clear  ENMT: No tonsillar erythema, exudates, or enlargement; Moist mucous membranes, Good dentition, No lesions  NECK: Supple, No JVD, Normal thyroid  NERVOUS SYSTEM:  Alert & Oriented X3, Good concentration; Motor Strength 5/5 B/L upper and lower extremities; DTRs 2+ intact and symmetric  PULM: Clear to auscultation bilaterally  CARDIAC: Regular rate and rhythm; No murmurs, rubs, or gallops  GI: Soft, Nontender, Nondistended; Bowel sounds present  EXTREMITIES:  2+ Peripheral Pulses, No clubbing, cyanosis, or edema  LYMPH: No lymphadenopathy noted  SKIN: No rashes or lesions    Consultant(s) Notes Reviewed:  [x ] YES  [ ] NO  Care Discussed with Consultants/Other Providers [ x] YES  [ ] NO    LABS:                            12.6   6.03  )-----------( 154      ( 27 Dec 2023 12:29 )             38.1   12-27    138  |  100  |  18  ----------------------------<  109<H>  5.7<H>   |  26  |  0.8    Ca    8.8      27 Dec 2023 12:29  Mg     2.0     12-27    TPro  6.9  /  Alb  3.7  /  TBili  0.4  /  DBili  x   /  AST  64<H>  /  ALT  30  /  AlkPhos  58  12-27            albuterol/ipratropium for Nebulization 3 milliLiter(s) Nebulizer every 6 hours  aspirin enteric coated 81 milliGRAM(s) Oral daily  atorvastatin 40 milliGRAM(s) Oral at bedtime  carvedilol 12.5 milliGRAM(s) Oral every 12 hours  enoxaparin Injectable 40 milliGRAM(s) SubCutaneous every 24 hours  furosemide   Injectable 20 milliGRAM(s) IV Push daily  meclizine 12.5 milliGRAM(s) Oral daily  oseltamivir 75 milliGRAM(s) Oral two times a day       69 yo man Belizean speaking with pmh of CAD, and HTN, presents for fall from bed with trauma to shoulders.  No head trauma, no AC, no syncope   patient admitted to shortness of breath and coughing  since 2 days. He reports that his wife had the flu recently.  No chest  pain, no palpitations, no leg swelling, no syncope    Patient is a very poor historian        1. Dyspnea secondary to Acute bronchitis by Influenza A and RSV + Acute unspecified CHF   - Admit to medicine       - ECG: pending                        -CXR Interstitial marking. BNP significantly elevated  . Repeat CXR in am   - Cont steroid with prednisone 40mg d:1  - Cont bronchodilator   - Cont Tamiflu d:1   - trop   - Cont lasix 20mg IV bid   - Echocardio:pending   -pulse ox monitoring and O2 PRN     2. Fall mechanical   -PT eval :pending     3. Hyperkalemia - hemolyzed   - repeat K     4. CAD/HTN/ Dyslipidemia   -c/w OP Rx and restart ACE if K - WNL      SABI WILKERSON  68y  [facility]      Patient is a 68y old  Male who presents with a chief complaint of FALL (27 Dec 2023 21:49)      INTERVAL HPI/OVERNIGHT EVENTS:        REVIEW OF SYSTEMS:        FAMILY HISTORY:    T(C): 36.1 (12-28-23 @ 07:48), Max: 37.7 (12-27-23 @ 11:37)  HR: 56 (12-28-23 @ 07:48) (55 - 63)  BP: 144/70 (12-28-23 @ 07:48) (128/58 - 169/77)  RR: 18 (12-28-23 @ 07:48) (18 - 19)  SpO2: 96% (12-28-23 @ 07:48) (93% - 100%)  Wt(kg): --Vital Signs Last 24 Hrs  T(C): 36.1 (28 Dec 2023 07:48), Max: 37.7 (27 Dec 2023 11:37)  T(F): 97 (28 Dec 2023 07:48), Max: 99.9 (27 Dec 2023 11:37)  HR: 56 (28 Dec 2023 07:48) (55 - 63)  BP: 144/70 (28 Dec 2023 07:48) (128/58 - 169/77)  BP(mean): --  RR: 18 (28 Dec 2023 07:48) (18 - 19)  SpO2: 96% (28 Dec 2023 07:48) (93% - 100%)    Parameters below as of 28 Dec 2023 06:56  Patient On (Oxygen Delivery Method): room air        PHYSICAL EXAM:  GENERAL: NAD, well-groomed, well-developed  HEAD:  Atraumatic, Normocephalic  EYES: EOMI, PERRLA, conjunctiva and sclera clear  ENMT: No tonsillar erythema, exudates, or enlargement; Moist mucous membranes, Good dentition, No lesions  NECK: Supple, No JVD, Normal thyroid  NERVOUS SYSTEM:  Alert & Oriented X3, Good concentration; Motor Strength 5/5 B/L upper and lower extremities; DTRs 2+ intact and symmetric  PULM: Clear to auscultation bilaterally  CARDIAC: Regular rate and rhythm; No murmurs, rubs, or gallops  GI: Soft, Nontender, Nondistended; Bowel sounds present  EXTREMITIES:  2+ Peripheral Pulses, No clubbing, cyanosis, or edema  LYMPH: No lymphadenopathy noted  SKIN: No rashes or lesions    Consultant(s) Notes Reviewed:  [x ] YES  [ ] NO  Care Discussed with Consultants/Other Providers [ x] YES  [ ] NO    LABS:                            12.6   6.03  )-----------( 154      ( 27 Dec 2023 12:29 )             38.1   12-27    138  |  100  |  18  ----------------------------<  109<H>  5.7<H>   |  26  |  0.8    Ca    8.8      27 Dec 2023 12:29  Mg     2.0     12-27    TPro  6.9  /  Alb  3.7  /  TBili  0.4  /  DBili  x   /  AST  64<H>  /  ALT  30  /  AlkPhos  58  12-27            albuterol/ipratropium for Nebulization 3 milliLiter(s) Nebulizer every 6 hours  aspirin enteric coated 81 milliGRAM(s) Oral daily  atorvastatin 40 milliGRAM(s) Oral at bedtime  carvedilol 12.5 milliGRAM(s) Oral every 12 hours  enoxaparin Injectable 40 milliGRAM(s) SubCutaneous every 24 hours  furosemide   Injectable 20 milliGRAM(s) IV Push daily  meclizine 12.5 milliGRAM(s) Oral daily  oseltamivir 75 milliGRAM(s) Oral two times a day       69 yo man [place]an speaking with pmh of CAD, and HTN, presents for fall from bed with trauma to shoulders.  No head trauma, no AC, no syncope   patient admitted to shortness of breath and coughing  since 2 days. He reports that his wife had the flu recently.  No chest  pain, no palpitations, no leg swelling, no syncope    Patient is a very poor historian        1. Dyspnea secondary to Acute bronchitis by Influenza A and RSV + Acute unspecified CHF   - Admit to medicine       - ECG: pending                        -CXR Interstitial marking. BNP significantly elevated  . Repeat CXR in am   - Cont steroid with prednisone 40mg d:1  - Cont bronchodilator   - Cont Tamiflu d:1   - trop   - Cont lasix 20mg IV bid   - Echocardio:pending   -pulse ox monitoring and O2 PRN     2. Fall mechanical   -PT eval :pending     3. Hyperkalemia - hemolyzed   - repeat K     4. CAD/HTN/ Dyslipidemia   -c/w OP Rx and restart ACE if K - WNL      RHEA SABI  68y  [facility]      Patient is a 68y old  Male who presents with a chief complaint of FALL (27 Dec 2023 21:49)      INTERVAL HPI/OVERNIGHT EVENTS:    patient feeling better but complaining about staying in the er  still with cough but denies orthopnea, lower leg edema or active chest pain   no other events noted       FAMILY HISTORY:    T(C): 36.1 (12-28-23 @ 07:48), Max: 37.7 (12-27-23 @ 11:37)  HR: 56 (12-28-23 @ 07:48) (55 - 63)  BP: 144/70 (12-28-23 @ 07:48) (128/58 - 169/77)  RR: 18 (12-28-23 @ 07:48) (18 - 19)  SpO2: 96% (12-28-23 @ 07:48) (93% - 100%)  Wt(kg): --Vital Signs Last 24 Hrs  T(C): 36.1 (28 Dec 2023 07:48), Max: 37.7 (27 Dec 2023 11:37)  T(F): 97 (28 Dec 2023 07:48), Max: 99.9 (27 Dec 2023 11:37)  HR: 56 (28 Dec 2023 07:48) (55 - 63)  BP: 144/70 (28 Dec 2023 07:48) (128/58 - 169/77)  BP(mean): --  RR: 18 (28 Dec 2023 07:48) (18 - 19)  SpO2: 96% (28 Dec 2023 07:48) (93% - 100%)    Parameters below as of 28 Dec 2023 06:56  Patient On (Oxygen Delivery Method): room air        PHYSICAL EXAM:  GENERAL: NAD, well-groomed, well-developed  NERVOUS SYSTEM:  Alert & Oriented X3,  PULM: Crackles  noted   CARDIAC: Regular rate and rhythm;   GI: Soft, Nontender, Nondistended; Bowel sounds present  EXTREMITIES:  2+ Peripheral Pulses,    Consultant(s) Notes Reviewed:  [x ] YES  [ ] NO  Care Discussed with Consultants/Other Providers [ x] YES  [ ] NO    LABS:                            12.6   6.03  )-----------( 154      ( 27 Dec 2023 12:29 )             38.1   12-27    138  |  100  |  18  ----------------------------<  109<H>  5.7<H>   |  26  |  0.8    Ca    8.8      27 Dec 2023 12:29  Mg     2.0     12-27    TPro  6.9  /  Alb  3.7  /  TBili  0.4  /  DBili  x   /  AST  64<H>  /  ALT  30  /  AlkPhos  58  12-27            albuterol/ipratropium for Nebulization 3 milliLiter(s) Nebulizer every 6 hours  aspirin enteric coated 81 milliGRAM(s) Oral daily  atorvastatin 40 milliGRAM(s) Oral at bedtime  carvedilol 12.5 milliGRAM(s) Oral every 12 hours  enoxaparin Injectable 40 milliGRAM(s) SubCutaneous every 24 hours  furosemide   Injectable 20 milliGRAM(s) IV Push daily  meclizine 12.5 milliGRAM(s) Oral daily  oseltamivir 75 milliGRAM(s) Oral two times a day       67 yo man [place]ian speaking with pmh of CAD, and HTN, presents for fall from bed with trauma to shoulders.  No head trauma, no AC, no syncope   patient admitted to shortness of breath and coughing  since 2 days. He reports that his wife had the flu recently.  No chest  pain, no palpitations, no leg swelling, no syncope    Patient is a very poor historian        1. Dyspnea secondary to Acute bronchitis by Influenza A and RSV + Acute unspecified CHF   - Admit to medicine       - ECG: pending                        -CXR Interstitial marking. BNP significantly elevated  . Repeat CXR in am   - Cont steroid with prednisone 40mg d:1  - Cont bronchodilator   - Cont Tamiflu d:1   - trop   - Received lasix 20mg IV*1 followed by lasix 40mg po daily starting tmr   - Echocardio:pending   -pulse ox monitoring and O2 PRN     2. Fall mechanical   -PT eval :pending     3. Hyperkalemia - hemolyzed   - repeat K     4. CAD/HTN/ Dyslipidemia   -c/w OP Rx and restart ACE if K - WNL     5. Acute hyperglycemia due to steroid and inflammation   - Insulin sliding scale   - Hgba1c:pending     6. DVT/GI px      RHEA SABI  68y  [facility]      Patient is a 68y old  Male who presents with a chief complaint of FALL (27 Dec 2023 21:49)      INTERVAL HPI/OVERNIGHT EVENTS:    patient feeling better but complaining about staying in the er  still with cough but denies orthopnea, lower leg edema or active chest pain   no other events noted       FAMILY HISTORY:    T(C): 36.1 (12-28-23 @ 07:48), Max: 37.7 (12-27-23 @ 11:37)  HR: 56 (12-28-23 @ 07:48) (55 - 63)  BP: 144/70 (12-28-23 @ 07:48) (128/58 - 169/77)  RR: 18 (12-28-23 @ 07:48) (18 - 19)  SpO2: 96% (12-28-23 @ 07:48) (93% - 100%)  Wt(kg): --Vital Signs Last 24 Hrs  T(C): 36.1 (28 Dec 2023 07:48), Max: 37.7 (27 Dec 2023 11:37)  T(F): 97 (28 Dec 2023 07:48), Max: 99.9 (27 Dec 2023 11:37)  HR: 56 (28 Dec 2023 07:48) (55 - 63)  BP: 144/70 (28 Dec 2023 07:48) (128/58 - 169/77)  BP(mean): --  RR: 18 (28 Dec 2023 07:48) (18 - 19)  SpO2: 96% (28 Dec 2023 07:48) (93% - 100%)    Parameters below as of 28 Dec 2023 06:56  Patient On (Oxygen Delivery Method): room air        PHYSICAL EXAM:  GENERAL: NAD, well-groomed, well-developed  NERVOUS SYSTEM:  Alert & Oriented X3,  PULM: Crackles  noted   CARDIAC: Regular rate and rhythm;   GI: Soft, Nontender, Nondistended; Bowel sounds present  EXTREMITIES:  2+ Peripheral Pulses,    Consultant(s) Notes Reviewed:  [x ] YES  [ ] NO  Care Discussed with Consultants/Other Providers [ x] YES  [ ] NO    LABS:                            12.6   6.03  )-----------( 154      ( 27 Dec 2023 12:29 )             38.1   12-27    138  |  100  |  18  ----------------------------<  109<H>  5.7<H>   |  26  |  0.8    Ca    8.8      27 Dec 2023 12:29  Mg     2.0     12-27    TPro  6.9  /  Alb  3.7  /  TBili  0.4  /  DBili  x   /  AST  64<H>  /  ALT  30  /  AlkPhos  58  12-27            albuterol/ipratropium for Nebulization 3 milliLiter(s) Nebulizer every 6 hours  aspirin enteric coated 81 milliGRAM(s) Oral daily  atorvastatin 40 milliGRAM(s) Oral at bedtime  carvedilol 12.5 milliGRAM(s) Oral every 12 hours  enoxaparin Injectable 40 milliGRAM(s) SubCutaneous every 24 hours  furosemide   Injectable 20 milliGRAM(s) IV Push daily  meclizine 12.5 milliGRAM(s) Oral daily  oseltamivir 75 milliGRAM(s) Oral two times a day       69 yo man Telugu speaking with pmh of CAD, and HTN, presents for fall from bed with trauma to shoulders.  No head trauma, no AC, no syncope   patient admitted to shortness of breath and coughing  since 2 days. He reports that his wife had the flu recently.  No chest  pain, no palpitations, no leg swelling, no syncope    Patient is a very poor historian        1. Dyspnea secondary to Acute bronchitis by Influenza A and RSV + Acute unspecified CHF   - Admit to medicine       - ECG: pending                        -CXR Interstitial marking. BNP significantly elevated  . Repeat CXR in am   - Cont steroid with prednisone 40mg d:1  - Cont bronchodilator   - Cont Tamiflu d:1   - trop   - Received lasix 20mg IV*1 followed by lasix 40mg po daily starting tmr   - Echocardio:pending   -pulse ox monitoring and O2 PRN     2. Fall mechanical   -PT eval :pending     3. Hyperkalemia - hemolyzed   - repeat K     4. CAD/HTN/ Dyslipidemia   -c/w OP Rx and restart ACE if K - WNL     5. Acute hyperglycemia due to steroid and inflammation   - Insulin sliding scale   - Hgba1c:pending     6. DVT/GI px      RHEA SABI  68y  [facility]      Patient is a 68y old  Male who presents with a chief complaint of FALL (27 Dec 2023 21:49)      INTERVAL HPI/OVERNIGHT EVENTS:    patient feeling better but complaining about staying in the er  still with cough but denies orthopnea, lower leg edema or active chest pain   no other events noted       FAMILY HISTORY:    T(C): 36.1 (12-28-23 @ 07:48), Max: 37.7 (12-27-23 @ 11:37)  HR: 56 (12-28-23 @ 07:48) (55 - 63)  BP: 144/70 (12-28-23 @ 07:48) (128/58 - 169/77)  RR: 18 (12-28-23 @ 07:48) (18 - 19)  SpO2: 96% (12-28-23 @ 07:48) (93% - 100%)  Wt(kg): --Vital Signs Last 24 Hrs  T(C): 36.1 (28 Dec 2023 07:48), Max: 37.7 (27 Dec 2023 11:37)  T(F): 97 (28 Dec 2023 07:48), Max: 99.9 (27 Dec 2023 11:37)  HR: 56 (28 Dec 2023 07:48) (55 - 63)  BP: 144/70 (28 Dec 2023 07:48) (128/58 - 169/77)  BP(mean): --  RR: 18 (28 Dec 2023 07:48) (18 - 19)  SpO2: 96% (28 Dec 2023 07:48) (93% - 100%)    Parameters below as of 28 Dec 2023 06:56  Patient On (Oxygen Delivery Method): room air        PHYSICAL EXAM:  GENERAL: NAD, well-groomed, well-developed  NERVOUS SYSTEM:  Alert & Oriented X3,  PULM: Crackles  noted   CARDIAC: Regular rate and rhythm;   GI: Soft, Nontender, Nondistended; Bowel sounds present  EXTREMITIES:  2+ Peripheral Pulses,    Consultant(s) Notes Reviewed:  [x ] YES  [ ] NO  Care Discussed with Consultants/Other Providers [ x] YES  [ ] NO    LABS:                            12.6   6.03  )-----------( 154      ( 27 Dec 2023 12:29 )             38.1   12-27    138  |  100  |  18  ----------------------------<  109<H>  5.7<H>   |  26  |  0.8    Ca    8.8      27 Dec 2023 12:29  Mg     2.0     12-27    TPro  6.9  /  Alb  3.7  /  TBili  0.4  /  DBili  x   /  AST  64<H>  /  ALT  30  /  AlkPhos  58  12-27            albuterol/ipratropium for Nebulization 3 milliLiter(s) Nebulizer every 6 hours  aspirin enteric coated 81 milliGRAM(s) Oral daily  atorvastatin 40 milliGRAM(s) Oral at bedtime  carvedilol 12.5 milliGRAM(s) Oral every 12 hours  enoxaparin Injectable 40 milliGRAM(s) SubCutaneous every 24 hours  furosemide   Injectable 20 milliGRAM(s) IV Push daily  meclizine 12.5 milliGRAM(s) Oral daily  oseltamivir 75 milliGRAM(s) Oral two times a day       69 yo man [place] speaking with pmh of CAD, and HTN, presents for fall from bed with trauma to shoulders.  No head trauma, no AC, no syncope   patient admitted to shortness of breath and coughing  since 2 days. He reports that his wife had the flu recently.  No chest  pain, no palpitations, no leg swelling, no syncope    1. Dyspnea secondary to Acute bronchitis by Influenza A and RSV + Acute unspecified CHF   - Admit to medicine       - ECG: pending                         -CXR Interstitial marking. BNP significantly elevated  . Repeat CXR in am   - Cont steroid with prednisone 40mg d:1  - Cont bronchodilator   - Cont Tamiflu d:1   - trop 36   - Received lasix 20mg IV*1 followed by lasix 40mg po daily starting tmr   - Echocardio:pending   -pulse ox monitoring and O2 PRN     2. Fall mechanical   -PT eval :pending     3. Hyperkalemia - hemolyzed  resolved     4. CAD/HTN/ Dyslipidemia   - Re-started lisinopril     5. Acute hyperglycemia due to steroid and inflammation   - Insulin sliding scale   - Hgba1c:pending     6. DVT/GI px      RHEA SABI  68y  [facility]      Patient is a 68y old  Male who presents with a chief complaint of FALL (27 Dec 2023 21:49)      INTERVAL HPI/OVERNIGHT EVENTS:    patient feeling better but complaining about staying in the er  still with cough but denies orthopnea, lower leg edema or active chest pain   no other events noted       FAMILY HISTORY:    T(C): 36.1 (12-28-23 @ 07:48), Max: 37.7 (12-27-23 @ 11:37)  HR: 56 (12-28-23 @ 07:48) (55 - 63)  BP: 144/70 (12-28-23 @ 07:48) (128/58 - 169/77)  RR: 18 (12-28-23 @ 07:48) (18 - 19)  SpO2: 96% (12-28-23 @ 07:48) (93% - 100%)  Wt(kg): --Vital Signs Last 24 Hrs  T(C): 36.1 (28 Dec 2023 07:48), Max: 37.7 (27 Dec 2023 11:37)  T(F): 97 (28 Dec 2023 07:48), Max: 99.9 (27 Dec 2023 11:37)  HR: 56 (28 Dec 2023 07:48) (55 - 63)  BP: 144/70 (28 Dec 2023 07:48) (128/58 - 169/77)  BP(mean): --  RR: 18 (28 Dec 2023 07:48) (18 - 19)  SpO2: 96% (28 Dec 2023 07:48) (93% - 100%)    Parameters below as of 28 Dec 2023 06:56  Patient On (Oxygen Delivery Method): room air        PHYSICAL EXAM:  GENERAL: NAD, well-groomed, well-developed  NERVOUS SYSTEM:  Alert & Oriented X3,  PULM: Crackles  noted   CARDIAC: Regular rate and rhythm;   GI: Soft, Nontender, Nondistended; Bowel sounds present  EXTREMITIES:  2+ Peripheral Pulses,    Consultant(s) Notes Reviewed:  [x ] YES  [ ] NO  Care Discussed with Consultants/Other Providers [ x] YES  [ ] NO    LABS:                            12.6   6.03  )-----------( 154      ( 27 Dec 2023 12:29 )             38.1   12-27    138  |  100  |  18  ----------------------------<  109<H>  5.7<H>   |  26  |  0.8    Ca    8.8      27 Dec 2023 12:29  Mg     2.0     12-27    TPro  6.9  /  Alb  3.7  /  TBili  0.4  /  DBili  x   /  AST  64<H>  /  ALT  30  /  AlkPhos  58  12-27            albuterol/ipratropium for Nebulization 3 milliLiter(s) Nebulizer every 6 hours  aspirin enteric coated 81 milliGRAM(s) Oral daily  atorvastatin 40 milliGRAM(s) Oral at bedtime  carvedilol 12.5 milliGRAM(s) Oral every 12 hours  enoxaparin Injectable 40 milliGRAM(s) SubCutaneous every 24 hours  furosemide   Injectable 20 milliGRAM(s) IV Push daily  meclizine 12.5 milliGRAM(s) Oral daily  oseltamivir 75 milliGRAM(s) Oral two times a day       69 yo man Ma[place] speaking with pmh of CAD, and HTN, presents for fall from bed with trauma to shoulders.  No head trauma, no AC, no syncope   patient admitted to shortness of breath and coughing  since 2 days. He reports that his wife had the flu recently.  No chest  pain, no palpitations, no leg swelling, no syncope    1. Dyspnea secondary to Acute bronchitis by Influenza A and RSV + Acute unspecified CHF   - Admit to medicine       - ECG: pending                         -CXR Interstitial marking. BNP significantly elevated  . Repeat CXR in am   - Cont steroid with prednisone 40mg d:1  - Cont bronchodilator   - Cont Tamiflu d:1   - trop 36   - Received lasix 20mg IV*1 followed by lasix 40mg po daily starting tmr   - Echocardio:pending   -pulse ox monitoring and O2 PRN     2. Fall mechanical   -PT eval :pending     3. Hyperkalemia - hemolyzed  resolved     4. CAD/HTN/ Dyslipidemia   - Re-started lisinopril     5. Acute hyperglycemia due to steroid and inflammation   - Insulin sliding scale   - Hgba1c:pending     6. DVT/GI px

## 2023-12-29 VITALS — TEMPERATURE: 97 F | HEART RATE: 56 BPM | RESPIRATION RATE: 18 BRPM | OXYGEN SATURATION: 94 %

## 2023-12-29 LAB
GLUCOSE BLDC GLUCOMTR-MCNC: 169 MG/DL — HIGH (ref 70–99)
GLUCOSE BLDC GLUCOMTR-MCNC: 169 MG/DL — HIGH (ref 70–99)
GLUCOSE BLDC GLUCOMTR-MCNC: 229 MG/DL — HIGH (ref 70–99)
GLUCOSE BLDC GLUCOMTR-MCNC: 229 MG/DL — HIGH (ref 70–99)
GLUCOSE BLDC GLUCOMTR-MCNC: 233 MG/DL — HIGH (ref 70–99)
GLUCOSE BLDC GLUCOMTR-MCNC: 233 MG/DL — HIGH (ref 70–99)

## 2023-12-29 PROCEDURE — 71045 X-RAY EXAM CHEST 1 VIEW: CPT | Mod: 26

## 2023-12-29 PROCEDURE — 93306 TTE W/DOPPLER COMPLETE: CPT | Mod: 26

## 2023-12-29 PROCEDURE — 99239 HOSP IP/OBS DSCHRG MGMT >30: CPT | Mod: GC

## 2023-12-29 RX ORDER — ALBUTEROL 90 UG/1
2 AEROSOL, METERED ORAL
Qty: 99 | Refills: 0
Start: 2023-12-29 | End: 2024-02-11

## 2023-12-29 RX ORDER — BUDESONIDE AND FORMOTEROL FUMARATE DIHYDRATE 160; 4.5 UG/1; UG/1
2 AEROSOL RESPIRATORY (INHALATION)
Qty: 1 | Refills: 0
Start: 2023-12-29 | End: 2024-01-27

## 2023-12-29 RX ORDER — FUROSEMIDE 40 MG
1 TABLET ORAL
Qty: 30 | Refills: 0
Start: 2023-12-29 | End: 2024-01-27

## 2023-12-29 RX ORDER — FUROSEMIDE 40 MG
1 TABLET ORAL
Qty: 45 | Refills: 0
Start: 2023-12-29 | End: 2024-02-11

## 2023-12-29 RX ADMIN — Medication 1: at 09:03

## 2023-12-29 RX ADMIN — LISINOPRIL 10 MILLIGRAM(S): 2.5 TABLET ORAL at 06:25

## 2023-12-29 RX ADMIN — Medication 75 MILLIGRAM(S): at 06:24

## 2023-12-29 RX ADMIN — Medication 40 MILLIGRAM(S): at 06:25

## 2023-12-29 RX ADMIN — Medication 3 MILLILITER(S): at 04:26

## 2023-12-29 RX ADMIN — Medication 3 UNIT(S): at 12:46

## 2023-12-29 RX ADMIN — Medication 40 MILLIGRAM(S): at 06:24

## 2023-12-29 RX ADMIN — Medication 2: at 12:47

## 2023-12-29 RX ADMIN — Medication 81 MILLIGRAM(S): at 12:48

## 2023-12-29 RX ADMIN — Medication 12.5 MILLIGRAM(S): at 12:47

## 2023-12-29 RX ADMIN — CARVEDILOL PHOSPHATE 12.5 MILLIGRAM(S): 80 CAPSULE, EXTENDED RELEASE ORAL at 06:24

## 2023-12-29 RX ADMIN — Medication 3 MILLILITER(S): at 11:07

## 2023-12-29 NOTE — DISCHARGE NOTE PROVIDER - CARE PROVIDER_API CALL
Your PCP,   Phone: (   )    -  Fax: (   )    -  Follow Up Time: 1 week   Your PCP,   Phone: (   )    -  Fax: (   )    -  Follow Up Time: 1 week    Behuria, Supreeti  Cardiology  [address] [place] [address], [address] [address]  Phone: [phone]  Fax: [phone]  Follow Up Time: 2 weeks   Your PCP,   Phone: (   )    -  Fax: (   )    -  Follow Up Time: 1 week    Behuria, Supreeti  Cardiology  [address] [place] [address], [address] [address]  Phone: [phone]  Fax: [phone]  Follow Up Time: 2 weeks

## 2023-12-29 NOTE — PHYSICAL THERAPY INITIAL EVALUATION ADULT - GENERAL OBSERVATIONS, REHAB EVAL
[id number] am Chart reviewed. Pt. seen out of bed ,standing in the room, completed dressing/ changing himself. Pt assisted to a bedside chair . Pt. alert and oriented X 4, Pt. agreed to activity/therapy.

## 2023-12-29 NOTE — DISCHARGE NOTE PROVIDER - ATTENDING DISCHARGE PHYSICAL EXAMINATION:
VITALS:   T(C): 35.9 (12-29-23 @ 07:59), Max: 36.9 (12-29-23 @ 05:24)  HR: 76 (12-29-23 @ 07:59) (54 - 76)  BP: 158/72 (12-29-23 @ 07:59) (151/69 - 167/72)  RR: 18 (12-29-23 @ 07:59) (18 - 18)  SpO2: 94% (12-29-23 @ 07:59) (94% - 97%)    GENERAL: NAD, lying in bed comfortably  HEART: Regular rate and rhythm,  LUNGS: Diffuse crackles   ABDOMEN: Soft, nontender, nondistended, +BS  EXTREMITIES: 2+ peripheral pulses bilaterally.  NERVOUS SYSTEM:  A&Ox3,

## 2023-12-29 NOTE — PROGRESS NOTE ADULT - SUBJECTIVE AND OBJECTIVE BOX
RHEA SABI  68y  [facility]      Patient is a 68y old  Male who presents with a chief complaint of FALL (27 Dec 2023 21:49)      INTERVAL HPI/OVERNIGHT EVENTS:    patient feeling better but complaining about staying in the er  still with cough but denies orthopnea, lower leg edema or active chest pain   no other events noted       FAMILY HISTORY:    T(C): 36.1 (12-28-23 @ 07:48), Max: 37.7 (12-27-23 @ 11:37)  HR: 56 (12-28-23 @ 07:48) (55 - 63)  BP: 144/70 (12-28-23 @ 07:48) (128/58 - 169/77)  RR: 18 (12-28-23 @ 07:48) (18 - 19)  SpO2: 96% (12-28-23 @ 07:48) (93% - 100%)  Wt(kg): --Vital Signs Last 24 Hrs  T(C): 36.1 (28 Dec 2023 07:48), Max: 37.7 (27 Dec 2023 11:37)  T(F): 97 (28 Dec 2023 07:48), Max: 99.9 (27 Dec 2023 11:37)  HR: 56 (28 Dec 2023 07:48) (55 - 63)  BP: 144/70 (28 Dec 2023 07:48) (128/58 - 169/77)  BP(mean): --  RR: 18 (28 Dec 2023 07:48) (18 - 19)  SpO2: 96% (28 Dec 2023 07:48) (93% - 100%)    Parameters below as of 28 Dec 2023 06:56  Patient On (Oxygen Delivery Method): room air        PHYSICAL EXAM:  GENERAL: NAD, well-groomed, well-developed  NERVOUS SYSTEM:  Alert & Oriented X3,  PULM: Crackles  noted   CARDIAC: Regular rate and rhythm;   GI: Soft, Nontender, Nondistended; Bowel sounds present  EXTREMITIES:  2+ Peripheral Pulses,    Consultant(s) Notes Reviewed:  [x ] YES  [ ] NO  Care Discussed with Consultants/Other Providers [ x] YES  [ ] NO    LABS:                            12.6   6.03  )-----------( 154      ( 27 Dec 2023 12:29 )             38.1   12-27    138  |  100  |  18  ----------------------------<  109<H>  5.7<H>   |  26  |  0.8    Ca    8.8      27 Dec 2023 12:29  Mg     2.0     12-27    TPro  6.9  /  Alb  3.7  /  TBili  0.4  /  DBili  x   /  AST  64<H>  /  ALT  30  /  AlkPhos  58  12-27            albuterol/ipratropium for Nebulization 3 milliLiter(s) Nebulizer every 6 hours  aspirin enteric coated 81 milliGRAM(s) Oral daily  atorvastatin 40 milliGRAM(s) Oral at bedtime  carvedilol 12.5 milliGRAM(s) Oral every 12 hours  enoxaparin Injectable 40 milliGRAM(s) SubCutaneous every 24 hours  furosemide   Injectable 20 milliGRAM(s) IV Push daily  meclizine 12.5 milliGRAM(s) Oral daily  oseltamivir 75 milliGRAM(s) Oral two times a day       69 yo man Slovenian speaking with pmh of CAD, and HTN, presents for fall from bed with trauma to shoulders.  No head trauma, no AC, no syncope   patient admitted to shortness of breath and coughing  since 2 days. He reports that his wife had the flu recently.  No chest  pain, no palpitations, no leg swelling, no syncope  In the hospital, patient was treated for the following condition:     1. Dyspnea secondary to Acute bronchitis by Influenza A and RSV + Acute unspecified CHF   - Admit to medicine       - ECG:Sinus bradycardia                      -CXR Interstitial marking. BNP significantly elevated  . Repeat CXR: improved   - Was on steroid with prednisone 40mg d:2. DC on Prednisone 40mg po for 3 more days   - Cont bronchodilator   - Was on Tamiflu d:2. DC on tamiflu po bid for 3 more days   - trop 36. delta negative   - Received lasix 20mg IV*1 followed by lasix 20mg po daily until seen by PMD . DC on lasix 20mg po daily   - Defer echo to outpatient     2. Fall mechanical   -PT eval:  a)     3. Hyperkalemia - hemolyzed  resolved     4. CAD/HTN/ Dyslipidemia   - Re-started lisinopril     5. DM-II well controlled   - Was on Insulin sliding scale . Cont metformin 500mg po bid .Consider increasing to 1000mg po bid   - Hgba1c:7.6         RHEA SABI  68y  [facility]      Patient is a 68y old  Male who presents with a chief complaint of FALL (27 Dec 2023 21:49)      INTERVAL HPI/OVERNIGHT EVENTS:    patient feeling better but complaining about staying in the er  still with cough but denies orthopnea, lower leg edema or active chest pain   no other events noted       FAMILY HISTORY:    T(C): 36.1 (12-28-23 @ 07:48), Max: 37.7 (12-27-23 @ 11:37)  HR: 56 (12-28-23 @ 07:48) (55 - 63)  BP: 144/70 (12-28-23 @ 07:48) (128/58 - 169/77)  RR: 18 (12-28-23 @ 07:48) (18 - 19)  SpO2: 96% (12-28-23 @ 07:48) (93% - 100%)  Wt(kg): --Vital Signs Last 24 Hrs  T(C): 36.1 (28 Dec 2023 07:48), Max: 37.7 (27 Dec 2023 11:37)  T(F): 97 (28 Dec 2023 07:48), Max: 99.9 (27 Dec 2023 11:37)  HR: 56 (28 Dec 2023 07:48) (55 - 63)  BP: 144/70 (28 Dec 2023 07:48) (128/58 - 169/77)  BP(mean): --  RR: 18 (28 Dec 2023 07:48) (18 - 19)  SpO2: 96% (28 Dec 2023 07:48) (93% - 100%)    Parameters below as of 28 Dec 2023 06:56  Patient On (Oxygen Delivery Method): room air        PHYSICAL EXAM:  GENERAL: NAD, well-groomed, well-developed  NERVOUS SYSTEM:  Alert & Oriented X3,  PULM: Crackles  noted   CARDIAC: Regular rate and rhythm;   GI: Soft, Nontender, Nondistended; Bowel sounds present  EXTREMITIES:  2+ Peripheral Pulses,    Consultant(s) Notes Reviewed:  [x ] YES  [ ] NO  Care Discussed with Consultants/Other Providers [ x] YES  [ ] NO    LABS:                            12.6   6.03  )-----------( 154      ( 27 Dec 2023 12:29 )             38.1   12-27    138  |  100  |  18  ----------------------------<  109<H>  5.7<H>   |  26  |  0.8    Ca    8.8      27 Dec 2023 12:29  Mg     2.0     12-27    TPro  6.9  /  Alb  3.7  /  TBili  0.4  /  DBili  x   /  AST  64<H>  /  ALT  30  /  AlkPhos  58  12-27            albuterol/ipratropium for Nebulization 3 milliLiter(s) Nebulizer every 6 hours  aspirin enteric coated 81 milliGRAM(s) Oral daily  atorvastatin 40 milliGRAM(s) Oral at bedtime  carvedilol 12.5 milliGRAM(s) Oral every 12 hours  enoxaparin Injectable 40 milliGRAM(s) SubCutaneous every 24 hours  furosemide   Injectable 20 milliGRAM(s) IV Push daily  meclizine 12.5 milliGRAM(s) Oral daily  oseltamivir 75 milliGRAM(s) Oral two times a day       69 yo man Kiswahili speaking with pmh of CAD, and HTN, presents for fall from bed with trauma to shoulders.  No head trauma, no AC, no syncope   patient admitted to shortness of breath and coughing  since 2 days. He reports that his wife had the flu recently.  No chest  pain, no palpitations, no leg swelling, no syncope  In the hospital, patient was treated for the following condition:     1. Dyspnea secondary to Acute bronchitis by Influenza A and RSV + Acute unspecified CHF   - Admit to medicine       - ECG:Sinus bradycardia                      -CXR Interstitial marking. BNP significantly elevated  . Repeat CXR: improved   - Was on steroid with prednisone 40mg d:2. DC on Prednisone 40mg po for 3 more days   - Cont bronchodilator   - Was on Tamiflu d:2. DC on tamiflu po bid for 3 more days   - trop 36. delta negative   - Received lasix 20mg IV*1 followed by lasix 20mg po daily until seen by PMD . DC on lasix 20mg po daily   - Defer echo to outpatient     2. Fall mechanical   -PT eval:  a)     3. Hyperkalemia - hemolyzed  resolved     4. CAD/HTN/ Dyslipidemia   - Re-started lisinopril     5. DM-II well controlled   - Was on Insulin sliding scale . Cont metformin 500mg po bid .Consider increasing to 1000mg po bid   - Hgba1c:7.6

## 2023-12-29 NOTE — DISCHARGE NOTE PROVIDER - NSDCCPCAREPLAN_GEN_ALL_CORE_FT
PRINCIPAL DISCHARGE DIAGNOSIS  Diagnosis: Influenza A  Assessment and Plan of Treatment: You were found to have flu, please complete the tamiflu course  You were also found to have findings suggestive of mild heart failure, you were started on a new medication called Lasix  Hold the lasix if you were feeling dizzy or if your blood pressure was dropping.  You need to do an echocardiogram (TTE) as OP to disgnosed this condition. Follow up with your PCP  Congestive heart failure (CHF) is a chronic condition in which the heart has trouble pumping blood. In some cases of heart failure, fluid may back up into your lungs or you may have swelling (edema) in your lower legs. There are many causes of heart failure including high blood pressure, coronary artery disease, abnormal heart valves, heart muscle disease, lung disease, diabetes, etc. Symptoms include shortness of breath with activity or when lying flat, cough, swelling of the legs, fatigue, or increased urination during the night.   Treatment is aimed at managing the symptoms of heart failure and may include lifestyle changes, medications, or surgical procedures. Take medicines only as directed by your health care provider and do not stop unless instructed to do so. Eat heart-healthy foods with low or no trans/saturated fats, cholesterol and salt. Weigh yourself every day for early recognition of fluid accumulation.  SEEK IMMEDIATE MEDICAL CARE IF YOU HAVE ANY OF THE FOLLOWING SYMPTOMS: shortness of breath, change in mental status, chest pain, lightheadedness/dizziness/fainting, or worsening of symptoms including not being able to conduct normal physical activity.        SECONDARY DISCHARGE DIAGNOSES  Diagnosis: Respiratory syncytial virus  Assessment and Plan of Treatment:     Diagnosis: Weakness  Assessment and Plan of Treatment:      PRINCIPAL DISCHARGE DIAGNOSIS  Diagnosis: Influenza A  Assessment and Plan of Treatment: You were found to have flu, please complete the tamiflu course  You were also found to have findings suggestive of mild heart failure, you were started on a new medication called Lasix  Hold the lasix if you were feeling dizzy or if your blood pressure was dropping.  Congestive heart failure (CHF) is a chronic condition in which the heart has trouble pumping blood. In some cases of heart failure, fluid may back up into your lungs or you may have swelling (edema) in your lower legs. There are many causes of heart failure including high blood pressure, coronary artery disease, abnormal heart valves, heart muscle disease, lung disease, diabetes, etc. Symptoms include shortness of breath with activity or when lying flat, cough, swelling of the legs, fatigue, or increased urination during the night.   Treatment is aimed at managing the symptoms of heart failure and may include lifestyle changes, medications, or surgical procedures. Take medicines only as directed by your health care provider and do not stop unless instructed to do so. Eat heart-healthy foods with low or no trans/saturated fats, cholesterol and salt. Weigh yourself every day for early recognition of fluid accumulation.  SEEK IMMEDIATE MEDICAL CARE IF YOU HAVE ANY OF THE FOLLOWING SYMPTOMS: shortness of breath, change in mental status, chest pain, lightheadedness/dizziness/fainting, or worsening of symptoms including not being able to conduct normal physical activity.        SECONDARY DISCHARGE DIAGNOSES  Diagnosis: Respiratory syncytial virus  Assessment and Plan of Treatment:     Diagnosis: Weakness  Assessment and Plan of Treatment:

## 2023-12-29 NOTE — DISCHARGE NOTE PROVIDER - NSDCMRMEDTOKEN_GEN_ALL_CORE_FT
Albuterol (Eqv-ProAir HFA) 90 mcg/inh inhalation aerosol: 2 puff(s) inhaled every 8 hours as needed for  shortness of breath and/or wheezing  Aspi-Cor 81 mg oral delayed release tablet: 1 tab(s) orally once a day  carvedilol 12.5 mg oral tablet: 1 tab(s) orally 2 times a day  furosemide 20 mg oral tablet: 1 tab(s) orally once a day  furosemide 20 mg oral tablet: 1 tab(s) orally once a day Hold if dizzy or low blood pressure  Lipitor 40 mg oral tablet: 1 tab(s) orally once a day  lisinopril 10 mg oral tablet: 1 tab(s) orally once a day  Meclicot 12.5 mg oral tablet: 1 tab(s) orally once a day  metFORMIN 500 mg oral tablet: 1 tab(s) orally 2 times a day  oseltamivir 75 mg oral capsule: 1 cap(s) orally 2 times a day  predniSONE 20 mg oral tablet: 2 tab(s) orally once a day for 3 days then stop   Albuterol (Eqv-ProAir HFA) 90 mcg/inh inhalation aerosol: 2 puff(s) inhaled every 8 hours as needed for  shortness of breath and/or wheezing  Aspi-Cor 81 mg oral delayed release tablet: 1 tab(s) orally once a day  carvedilol 12.5 mg oral tablet: 1 tab(s) orally 2 times a day  furosemide 20 mg oral tablet: 1 tab(s) orally once a day Take 2 tablet for one week then one tablet daily  Lipitor 40 mg oral tablet: 1 tab(s) orally once a day  lisinopril 10 mg oral tablet: 1 tab(s) orally once a day  Meclicot 12.5 mg oral tablet: 1 tab(s) orally once a day  metFORMIN 500 mg oral tablet: 1 tab(s) orally 2 times a day  oseltamivir 75 mg oral capsule: 1 cap(s) orally 2 times a day  predniSONE 20 mg oral tablet: 2 tab(s) orally once a day for 3 days then stop  Symbicort 80 mcg-4.5 mcg/inh inhalation aerosol: 2 puff(s) inhaled 2 times a day

## 2023-12-29 NOTE — DISCHARGE NOTE PROVIDER - HOSPITAL COURSE
69 yo man [place] speaking with pmh of CAD, and HTN, presents for fall from bed with trauma to shoulders.  No head trauma, no AC, no syncope   patient admitted to shortness of breath and coughing  since 2 days. He reports that his wife had the flu recently.  No chest  pain, no palpitations, no leg swelling, no syncope  In the hospital, patient was treated for influenza and RSV infection, started on tamiflu and prednisone  There was also questionable component of CHF, needs to do a TTE as OP  will send on lasix 20mg until patient sees their PCP    Full assessment as below    1. Dyspnea secondary to Acute bronchitis by Influenza A and RSV + Acute unspecified CHF   - Admit to medicine       - ECG:Sinus bradycardia                      -CXR Interstitial marking. BNP significantly elevated  . Repeat CXR: improved   - Was on steroid with prednisone 40mg d:2. DC on Prednisone 40mg po for 3 more days   - Cont bronchodilator   - Was on Tamiflu d:2. DC on tamiflu po bid for 3 more days   - trop 36. delta negative   - Received lasix 20mg IV*1 followed by lasix 20mg po daily until seen by PMD . DC on lasix 20mg po daily   - Defer echo to outpatient     2. Fall mechanical   -PT eval: no needs    3. Hyperkalemia - hemolyzed  resolved     4. CAD/HTN/ Dyslipidemia   - Re-started lisinopril     5. DM-II well controlled   - Was on Insulin sliding scale . Cont metformin 500mg po bid .Consider increasing to 1000mg po bid   - Hgba1c:7.6   69 yo man [place]an speaking with pmh of CAD, and HTN, presents for fall from bed with trauma to shoulders.  No head trauma, no AC, no syncope   patient admitted to shortness of breath and coughing  since 2 days. He reports that his wife had the flu recently.  No chest  pain, no palpitations, no leg swelling, no syncope  In the hospital, patient was treated for influenza and RSV infection, started on tamiflu and prednisone  There was also questionable component of CHF, needs to do a TTE as OP  will send on lasix 20mg until patient sees their PCP    Full assessment as below    1. Dyspnea secondary to Acute bronchitis by Influenza A and RSV + Acute unspecified CHF   - Admit to medicine       - ECG:Sinus bradycardia                      -CXR Interstitial marking. BNP significantly elevated  . Repeat CXR: improved   - Was on steroid with prednisone 40mg d:2. DC on Prednisone 40mg po for 3 more days   - Cont bronchodilator   - Was on Tamiflu d:2. DC on tamiflu po bid for 3 more days   - trop 36. delta negative   - Received lasix 20mg IV*1 followed by lasix 20mg po daily until seen by PMD . DC on lasix 20mg po daily   - Defer echo to outpatient     2. Fall mechanical   -PT eval: no needs    3. Hyperkalemia - hemolyzed  resolved     4. CAD/HTN/ Dyslipidemia   - Re-started lisinopril     5. DM-II well controlled   - Was on Insulin sliding scale . Cont metformin 500mg po bid .Consider increasing to 1000mg po bid   - Hgba1c:7.6   69 yo man [place] Islander speaking with pmh of CAD, and HTN, presents for fall from bed with trauma to shoulders.  No head trauma, no AC, no syncope   patient admitted to shortness of breath and coughing  since 2 days. He reports that his wife had the flu recently.  No chest  pain, no palpitations, no leg swelling, no syncope  In the hospital, patient was treated for influenza and RSV infection, started on tamiflu and prednisone  There was also questionable component of CHF, TTE done pending read  will send on lasix 20mg until patient sees their PCP    Full assessment as below    1. Dyspnea secondary to Acute bronchitis by Influenza A and RSV + Acute unspecified CHF   - Admit to medicine       - ECG:Sinus bradycardia                      -CXR Interstitial marking. BNP significantly elevated  . Repeat CXR: improved   - Was on steroid with prednisone 40mg d:2. DC on Prednisone 40mg po for 3 more days   - Cont bronchodilator   - Was on Tamiflu d:2. DC on tamiflu po bid for 3 more days   - trop 36. delta negative   - Received lasix 20mg IV*1 followed by lasix 20mg po daily until seen by PMD . DC on lasix 20mg po daily   - TTE done pending read    2. Fall mechanical   -PT eval: no needs    3. Hyperkalemia - hemolyzed  resolved     4. CAD/HTN/ Dyslipidemia   - Re-started lisinopril     5. DM-II well controlled   - Was on Insulin sliding scale . Cont metformin 500mg po bid .Consider increasing to 1000mg po bid   - Hgba1c:7.6   67 yo man [place]inean speaking with pmh of CAD, and HTN, presents for fall from bed with trauma to shoulders.  No head trauma, no AC, no syncope   patient admitted to shortness of breath and coughing  since 2 days. He reports that his wife had the flu recently.  No chest  pain, no palpitations, no leg swelling, no syncope  In the hospital, patient was treated for influenza and RSV infection, started on tamiflu and prednisone  There was also questionable component of CHF, TTE done pending read  will send on lasix 20mg until patient sees their PCP    Full assessment as below    1. Dyspnea secondary to Acute bronchitis by Influenza A and RSV + Acute unspecified CHF   - Admit to medicine       - ECG:Sinus bradycardia                      -CXR Interstitial marking. BNP significantly elevated  . Repeat CXR: improved   - Was on steroid with prednisone 40mg d:2. DC on Prednisone 40mg po for 3 more days   - Cont bronchodilator   - Was on Tamiflu d:2. DC on tamiflu po bid for 3 more days   - trop 36. delta negative   - Received lasix 20mg IV*1 followed by lasix 20mg po daily until seen by PMD . DC on lasix 20mg po daily   - TTE done pending read    2. Fall mechanical   -PT eval: no needs    3. Hyperkalemia - hemolyzed  resolved     4. CAD/HTN/ Dyslipidemia   - Re-started lisinopril     5. DM-II well controlled   - Was on Insulin sliding scale . Cont metformin 500mg po bid .Consider increasing to 1000mg po bid   - Hgba1c:7.6   69 yo man Polish speaking with pmh of CAD, and HTN, presents for fall from bed with trauma to shoulders.  No head trauma, no AC, no syncope   patient admitted to shortness of breath and coughing  since 2 days. He reports that his wife had the flu recently.  No chest  pain, no palpitations, no leg swelling, no syncope  In the hospital, patient was treated for influenza and RSV infection, started on tamiflu and prednisone  There was also questionable component of CHF, TTE done pending read  will send on lasix 20mg until patient sees their PCP    Full assessment as below    1. Dyspnea secondary to Acute bronchitis by Influenza A and RSV + Acute unspecified CHF   - Admit to medicine       - ECG:Sinus bradycardia                      -CXR Interstitial marking. BNP significantly elevated  . Repeat CXR: improved   - Was on steroid with prednisone 40mg d:2. DC on Prednisone 40mg po for 3 more days   - Cont bronchodilator   - Was on Tamiflu d:2. DC on tamiflu po bid for 3 more days   - trop 36. delta negative   - Received lasix 20mg IV*1 followed by lasix 20mg po daily until seen by PMD . DC on lasix 40mg po daily for one week then 20mg p odaily   - TTE done pending read    2. Fall mechanical   -PT eval: no needs    3. Hyperkalemia - hemolyzed  resolved     4. CAD/HTN/ Dyslipidemia   - Re-started lisinopril     5. DM-II well controlled   - Was on Insulin sliding scale . Cont metformin 500mg po bid .Consider increasing to 1000mg po bid   - Hgba1c:7.6    Patient feels better. still with no significant improvement but no active wheezing. no oxygen needed. he was also laying flat without any problems.   he 67 yo man [place]an speaking with pmh of CAD, and HTN, presents for fall from bed with trauma to shoulders.  No head trauma, no AC, no syncope   patient admitted to shortness of breath and coughing  since 2 days. He reports that his wife had the flu recently.  No chest  pain, no palpitations, no leg swelling, no syncope  In the hospital, patient was treated for influenza and RSV infection, started on tamiflu and prednisone  There was also questionable component of CHF, TTE done pending read  will send on lasix 20mg until patient sees their PCP    Full assessment as below    1. Dyspnea secondary to Acute bronchitis by Influenza A and RSV + Acute unspecified CHF   - Admit to medicine       - ECG:Sinus bradycardia                      -CXR Interstitial marking. BNP significantly elevated  . Repeat CXR: improved   - Was on steroid with prednisone 40mg d:2. DC on Prednisone 40mg po for 3 more days   - Cont bronchodilator   - Was on Tamiflu d:2. DC on tamiflu po bid for 3 more days   - trop 36. delta negative   - Received lasix 20mg IV*1 followed by lasix 20mg po daily until seen by PMD . DC on lasix 40mg po daily for one week then 20mg p odaily   - TTE done pending read    2. Fall mechanical   -PT eval: no needs    3. Hyperkalemia - hemolyzed  resolved     4. CAD/HTN/ Dyslipidemia   - Re-started lisinopril     5. DM-II well controlled   - Was on Insulin sliding scale . Cont metformin 500mg po bid .Consider increasing to 1000mg po bid   - Hgba1c:7.6    Patient feels better. still with no significant improvement but no active wheezing. no oxygen needed. he was also laying flat without any problems.   he

## 2023-12-29 NOTE — DISCHARGE NOTE PROVIDER - PROVIDER TOKENS
FREE:[LAST:[Your PCP],PHONE:[(   )    -],FAX:[(   )    -],FOLLOWUP:[1 week]] FREE:[LAST:[Your PCP],PHONE:[(   )    -],FAX:[(   )    -],FOLLOWUP:[1 week]],PROVIDER:[TOKEN:[[id number]],FOLLOWUP:[2 weeks]] FREE:[LAST:[Your PCP],PHONE:[(   )    -],FAX:[(   )    -],FOLLOWUP:[1 week]],PROVIDER:[TOKEN:[[id number]],FOLLOWUP:[2 weeks]]

## 2023-12-29 NOTE — PHYSICAL THERAPY INITIAL EVALUATION ADULT - SPECIFY REASON(S)
Upon assessment pt is independent with transfers and ambulation with no assistive device ; will not require skilled PT at this time.

## 2023-12-29 NOTE — PHYSICAL THERAPY INITIAL EVALUATION ADULT - PERTINENT HX OF CURRENT PROBLEM, REHAB EVAL
67 yo man Kos[place] speaking with pmh of CAD, and HTN, presents for fall from bed with trauma to shoulders.  No head trauma, no AC, no syncope   patient admitted to shortness of breath and coughing  since 2 days. He reports that his wife had the flu recently.  No chest  pain, no palpitations, no leg swelling, no syncope 69 yo man Sudanese speaking with pmh of CAD, and HTN, presents for fall from bed with trauma to shoulders.  No head trauma, no AC, no syncope   patient admitted to shortness of breath and coughing  since 2 days. He reports that his wife had the flu recently.  No chest  pain, no palpitations, no leg swelling, no syncope

## 2024-01-08 DIAGNOSIS — I25.10 ATHEROSCLEROTIC HEART DISEASE OF NATIVE CORONARY ARTERY WITHOUT ANGINA PECTORIS: ICD-10-CM

## 2024-01-08 DIAGNOSIS — J10.1 INFLUENZA DUE TO OTHER IDENTIFIED INFLUENZA VIRUS WITH OTHER RESPIRATORY MANIFESTATIONS: ICD-10-CM

## 2024-01-08 DIAGNOSIS — Z79.84 LONG TERM (CURRENT) USE OF ORAL HYPOGLYCEMIC DRUGS: ICD-10-CM

## 2024-01-08 DIAGNOSIS — I11.0 HYPERTENSIVE HEART DISEASE WITH HEART FAILURE: ICD-10-CM

## 2024-01-08 DIAGNOSIS — E87.5 HYPERKALEMIA: ICD-10-CM

## 2024-01-08 DIAGNOSIS — J20.5 ACUTE BRONCHITIS DUE TO RESPIRATORY SYNCYTIAL VIRUS: ICD-10-CM

## 2024-01-08 DIAGNOSIS — E11.65 TYPE 2 DIABETES MELLITUS WITH HYPERGLYCEMIA: ICD-10-CM

## 2024-01-08 DIAGNOSIS — R06.2 WHEEZING: ICD-10-CM

## 2024-01-08 DIAGNOSIS — R00.1 BRADYCARDIA, UNSPECIFIED: ICD-10-CM

## 2024-01-08 DIAGNOSIS — I50.31 ACUTE DIASTOLIC (CONGESTIVE) HEART FAILURE: ICD-10-CM

## 2024-01-08 DIAGNOSIS — Z87.891 PERSONAL HISTORY OF NICOTINE DEPENDENCE: ICD-10-CM

## 2025-06-03 ENCOUNTER — EMERGENCY (EMERGENCY)
Facility: HOSPITAL | Age: 70
LOS: 0 days | Discharge: HOME | End: 2025-06-03
Attending: STUDENT IN AN ORGANIZED HEALTH CARE EDUCATION/TRAINING PROGRAM
Payer: MEDICARE

## 2025-06-03 VITALS
SYSTOLIC BLOOD PRESSURE: 142 MMHG | DIASTOLIC BLOOD PRESSURE: 82 MMHG | HEART RATE: 66 BPM | WEIGHT: 220.02 LBS | HEIGHT: 60 IN | OXYGEN SATURATION: 98 % | RESPIRATION RATE: 18 BRPM | TEMPERATURE: 98 F

## 2025-06-03 DIAGNOSIS — Z98.890 OTHER SPECIFIED POSTPROCEDURAL STATES: Chronic | ICD-10-CM

## 2025-06-03 DIAGNOSIS — Z90.49 ACQUIRED ABSENCE OF OTHER SPECIFIED PARTS OF DIGESTIVE TRACT: Chronic | ICD-10-CM

## 2025-06-03 PROBLEM — I10 ESSENTIAL (PRIMARY) HYPERTENSION: Chronic | Status: ACTIVE | Noted: 2023-12-27

## 2025-06-03 PROBLEM — E11.9 TYPE 2 DIABETES MELLITUS WITHOUT COMPLICATIONS: Chronic | Status: ACTIVE | Noted: 2023-12-27

## 2025-06-03 PROBLEM — E78.5 HYPERLIPIDEMIA, UNSPECIFIED: Chronic | Status: ACTIVE | Noted: 2023-12-27

## 2025-06-03 PROBLEM — I25.10 ATHEROSCLEROTIC HEART DISEASE OF NATIVE CORONARY ARTERY WITHOUT ANGINA PECTORIS: Chronic | Status: ACTIVE | Noted: 2023-12-27

## 2025-06-03 PROCEDURE — 73630 X-RAY EXAM OF FOOT: CPT | Mod: RT

## 2025-06-03 PROCEDURE — 73562 X-RAY EXAM OF KNEE 3: CPT | Mod: LT

## 2025-06-03 PROCEDURE — 73630 X-RAY EXAM OF FOOT: CPT | Mod: 26,RT

## 2025-06-03 PROCEDURE — 73562 X-RAY EXAM OF KNEE 3: CPT | Mod: 26,LT

## 2025-06-03 PROCEDURE — 99284 EMERGENCY DEPT VISIT MOD MDM: CPT

## 2025-06-03 PROCEDURE — 99284 EMERGENCY DEPT VISIT MOD MDM: CPT | Mod: 25

## 2025-06-03 RX ORDER — ACETAMINOPHEN 500 MG/5ML
650 LIQUID (ML) ORAL ONCE
Refills: 0 | Status: COMPLETED | OUTPATIENT
Start: 2025-06-03 | End: 2025-06-03

## 2025-06-03 RX ADMIN — Medication 650 MILLIGRAM(S): at 16:30

## 2025-06-03 NOTE — ED PROVIDER NOTE - CLINICAL SUMMARY MEDICAL DECISION MAKING FREE TEXT BOX
Pt presented today after a mechanical fall. X-rays of injured areas are unremarkable for acute fx as interpreted by myself, Dr. Palacios. Will DC to outpatient follow up. Patient ambulatory during time of discharge.

## 2025-06-03 NOTE — ED PROVIDER NOTE - NSFOLLOWUPINSTRUCTIONS_ED_ALL_ED_FT
Fall Prevention    AMBULATORY CARE:    Fall prevention includes ways to make your home and other areas safer. Prevention also includes ways you can move more carefully to prevent a fall. Health conditions that cause changes in your blood pressure, vision, or muscle strength and coordination may increase your risk for falls. Medicines may also increase your risk for falls if they make you dizzy, weak, or sleepy.    Arrange to have someone call your local emergency number (911 in the [place]) if:    You have fallen and are found unconscious.    You have fallen and cannot move part of your body.  Call your doctor if:    You have fallen and have pain or a headache.    You have questions or concerns about your condition or care.  Fall prevention tips:    Stand or sit up slowly. This may help you keep your balance and prevent falls.    Use assistive devices as directed. Your healthcare provider may suggest that you use a cane or walker to help you keep your balance. You may need to have grab bars put in your bathroom near the toilet or in the shower.    Wear shoes that fit well and have soles that . Wear shoes both inside and outside. Use slippers with good . Do not wear shoes with high heels.    Stay active. Exercise can help strengthen your muscles and improve your balance. Your healthcare provider may recommend water aerobics or walking. He or she may also recommend physical therapy to improve your coordination. Never start an exercise program without talking to your healthcare provider first.  [employer] Walking for Exercise      Manage your medical conditions. Keep all appointments with your healthcare providers. Visit your eye doctor as directed.  Home safety tips:  Fall Prevention for Adults    Wear a personal alarm. This is a device that allows you to call for help if you fall. Ask your healthcare provider for more information.    Add items to prevent falls in the bathroom. Put nonslip strips on your bath or shower floor to prevent you from slipping. Use a bath mat if you do not have carpet in the bathroom. This will prevent you from falling when you step out of the bath or shower. Use a shower seat so you do not need to stand while you shower. Sit on the toilet or a chair in your bathroom to dry yourself and put on clothing. This will prevent you from losing your balance from drying or dressing yourself while you are standing.    Keep paths clear. Remove books, shoes, and other objects from walkways and stairs. Place cords for telephones and lamps out of the way so that you do not need to walk over them. Tape them down if you cannot move them. Remove small rugs. If you cannot remove a rug, secure it with double-sided tape. This will prevent you from tripping.    Install bright lights in your home. Use night lights to help light paths to the bathroom or kitchen. Always turn on the light before you start walking.    Keep items you use often on shelves within reach. Do not use a step stool to help you reach an item.    Paint or place reflective tape on the edges of your stairs. This will help you see the stairs better.  Plan ahead in case you do fall: Talk with family members, friends, and neighbors to create a fall plan. Someone will need to call for emergency help if you are injured or found unconscious. If possible, keep a mobile phone with you at all times, or wear an emergency alert device. You can contact emergency services by pressing a button on the device. Ask your healthcare provider for more information.    Follow up with your doctor as directed: Write down your questions so you remember to ask them during your visits.

## 2025-06-03 NOTE — ED PROVIDER NOTE - OBJECTIVE STATEMENT
71 yo man German speaking with pmh of CAD, and HTN, diabetes, HLD presents due to fall, no HT, no AC. Pt states he was walking outside to  his son and felt his L. knee give out/twist and he fell, landed on both hands. Pt endorses pain to L. knee and R. big toe. Pt denies chest pain, sob, lightheadedness, nausea, vomiting, headache.

## 2025-06-03 NOTE — ED ADULT TRIAGE NOTE - CHIEF COMPLAINT QUOTE
pt c/o abrasions to left arm and left leg s/p fall when knee gave out    denies any head injury, no ac use

## 2025-06-03 NOTE — ED PROVIDER NOTE - PATIENT PORTAL LINK FT
You can access the FollowMyHealth Patient Portal offered by [facility] by registering at the following website: [email or web address]. By joining [employer]’s FollowMyHealth portal, you will also be able to view your health information using other applications (apps) compatible with our system.

## 2025-06-03 NOTE — ED PROVIDER NOTE - PHYSICAL EXAMINATION
VITAL SIGNS: I have reviewed nursing notes and confirm.  CONSTITUTIONAL: well-appearing, non-toxic, NAD  SKIN: Warm dry, normal skin turgor, abrasion to L. knee and shin, R. first toe abrasion/ bruising and bleeding to eponychia  HEAD: NCAT  EYES: EOMI, PERRLA, no scleral icterus  ENT: Moist mucous membranes, normal pharynx with no erythema or exudates  NECK: Supple; non tender. Full ROM. No cervical LAD  CARD: RRR, no murmurs, rubs or gallops  RESP: clear to ausculation b/l.  No rales, rhonchi, or wheezing.  ABD: soft, + BS, non-tender, non-distended, no rebound or guarding. No CVA tenderness  EXT: decreased ROM to L. knee due to pain, R. first toe TTP and pain, no pedal edema, no calf tenderness  NEURO: normal motor. normal sensory. CN II-XII intact. Cerebellar testing normal. Normal gait.  PSYCH: Cooperative, appropriate.

## 2025-06-03 NOTE — ED PROVIDER NOTE - NS ED MD DISPO DISCHARGE
Per pt feeling well after surgery. Little to no pain taking tylenol, pt denies dizziness or discharge coming for the ear.  RN informed to call office if discharge coming from ear and/or will foul order, do not blow nose for two weeks or until MD states its okay. If needing to blow nose, to blow nose with mouth open. No physical activity like running, jumping, head bumping and head shaking. Avoid swimming/flying and no heavy lifting more than 10lbs until MD states its okay. If washing hair, to have a cotton ball with Vaseline to prevent water from going into ear. Dressing can be removed 24hrs after surgery, pt acknowledged and stated she will remove at 7pm. Pt scheduled for an appointment on 12/26 1215 at  with Dr. Thrasher. Pt had no further questions or concerns   Home